# Patient Record
Sex: FEMALE | Race: WHITE | Employment: UNEMPLOYED | URBAN - METROPOLITAN AREA
[De-identification: names, ages, dates, MRNs, and addresses within clinical notes are randomized per-mention and may not be internally consistent; named-entity substitution may affect disease eponyms.]

---

## 2018-06-05 PROBLEM — G25.0 ESSENTIAL TREMOR: Status: ACTIVE | Noted: 2018-06-05

## 2018-08-13 ENCOUNTER — TELEPHONE (OUTPATIENT)
Dept: ADMINISTRATIVE | Age: 48
End: 2018-08-13

## 2018-08-13 ENCOUNTER — OFFICE VISIT (OUTPATIENT)
Dept: FAMILY MEDICINE CLINIC | Age: 48
End: 2018-08-13
Payer: COMMERCIAL

## 2018-08-13 VITALS
SYSTOLIC BLOOD PRESSURE: 132 MMHG | WEIGHT: 252 LBS | DIASTOLIC BLOOD PRESSURE: 84 MMHG | OXYGEN SATURATION: 99 % | HEIGHT: 69 IN | RESPIRATION RATE: 20 BRPM | HEART RATE: 120 BPM | BODY MASS INDEX: 37.33 KG/M2

## 2018-08-13 DIAGNOSIS — E11.65 TYPE 2 DIABETES MELLITUS WITH HYPERGLYCEMIA, WITH LONG-TERM CURRENT USE OF INSULIN (HCC): Primary | ICD-10-CM

## 2018-08-13 DIAGNOSIS — F31.9 BIPOLAR 1 DISORDER (HCC): ICD-10-CM

## 2018-08-13 DIAGNOSIS — Z79.4 TYPE 2 DIABETES MELLITUS WITH HYPERGLYCEMIA, WITH LONG-TERM CURRENT USE OF INSULIN (HCC): Primary | ICD-10-CM

## 2018-08-13 DIAGNOSIS — F51.01 PRIMARY INSOMNIA: ICD-10-CM

## 2018-08-13 PROCEDURE — G8427 DOCREV CUR MEDS BY ELIG CLIN: HCPCS | Performed by: FAMILY MEDICINE

## 2018-08-13 PROCEDURE — G8417 CALC BMI ABV UP PARAM F/U: HCPCS | Performed by: FAMILY MEDICINE

## 2018-08-13 PROCEDURE — 99214 OFFICE O/P EST MOD 30 MIN: CPT | Performed by: FAMILY MEDICINE

## 2018-08-13 PROCEDURE — 2022F DILAT RTA XM EVC RTNOPTHY: CPT | Performed by: FAMILY MEDICINE

## 2018-08-13 PROCEDURE — 3046F HEMOGLOBIN A1C LEVEL >9.0%: CPT | Performed by: FAMILY MEDICINE

## 2018-08-13 PROCEDURE — 1036F TOBACCO NON-USER: CPT | Performed by: FAMILY MEDICINE

## 2018-08-13 RX ORDER — MIRTAZAPINE 30 MG/1
30 TABLET, FILM COATED ORAL NIGHTLY
Qty: 30 TABLET | Refills: 3 | Status: SHIPPED | OUTPATIENT
Start: 2018-08-13 | End: 2018-09-05 | Stop reason: HOSPADM

## 2018-08-13 ASSESSMENT — ENCOUNTER SYMPTOMS
SHORTNESS OF BREATH: 0
NAUSEA: 0
VOMITING: 0
DIARRHEA: 0

## 2018-08-13 ASSESSMENT — PATIENT HEALTH QUESTIONNAIRE - PHQ9
1. LITTLE INTEREST OR PLEASURE IN DOING THINGS: 0
SUM OF ALL RESPONSES TO PHQ QUESTIONS 1-9: 0
SUM OF ALL RESPONSES TO PHQ QUESTIONS 1-9: 0
SUM OF ALL RESPONSES TO PHQ9 QUESTIONS 1 & 2: 0
1. LITTLE INTEREST OR PLEASURE IN DOING THINGS: 0
2. FEELING DOWN, DEPRESSED OR HOPELESS: 0
2. FEELING DOWN, DEPRESSED OR HOPELESS: 0
SUM OF ALL RESPONSES TO PHQ QUESTIONS 1-9: 0
SUM OF ALL RESPONSES TO PHQ9 QUESTIONS 1 & 2: 0
SUM OF ALL RESPONSES TO PHQ QUESTIONS 1-9: 0

## 2018-08-13 NOTE — PROGRESS NOTES
times daily for 30 days. Take 1 in am 1 in afternoon and 2 hs. 120 capsule 5    chlorproMAZINE (THORAZINE) 25 MG tablet       aspirin (ECOTRIN LOW STRENGTH) 81 MG EC tablet Take 1 tablet by mouth 2 times daily 60 tablet 5    Exatsb-IpLqt-QpKgb-Meth-FA-DHA (PRENATE MINI) 18-0.6-0.4-350 MG CAPS Take 1 capsule by mouth daily 30 capsule 5    lithium 300 MG capsule Take 300 mg by mouth daily      baclofen (LIORESAL) 10 MG tablet Take 1 tablet by mouth 3 times daily (Patient taking differently: Take 10 mg by mouth daily ) 10 tablet 0    clonazePAM (KLONOPIN) 2 MG tablet Take 1 mg by mouth daily .  pregabalin (LYRICA) 50 MG capsule Take 1 capsule by mouth 3 times daily for 14 days. . 42 capsule 0     No current facility-administered medications for this visit. Wt Readings from Last 3 Encounters:   08/13/18 252 lb (114.3 kg)   08/02/18 230 lb (104.3 kg)   07/09/18 230 lb (104.3 kg)                   Vitals:    08/13/18 1507   BP: 132/84   Pulse: 120   Resp: 20   SpO2: 99%       Physical Exam   Constitutional: She is oriented to person, place, and time. She appears well-developed and well-nourished. No distress. Neck: Neck supple. Carotid bruit is not present. Cardiovascular: Normal rate, regular rhythm and normal heart sounds. Exam reveals no gallop. No murmur heard. Pulmonary/Chest: Effort normal and breath sounds normal. She has no wheezes. She has no rales. Abdominal: Soft. Bowel sounds are normal. She exhibits no distension. There is no tenderness. Musculoskeletal: She exhibits no edema. Neurological: She is alert and oriented to person, place, and time. Skin: Skin is warm and dry. Psychiatric: She has a normal mood and affect. Vitals reviewed. ASSESSMENT/PLAN  Prasad Mcneal was seen today for follow-up from hospital and insomnia.     Diagnoses and all orders for this visit:    Type 2 diabetes mellitus with hyperglycemia, with long-term current use of insulin ([address]  -     Cancel:

## 2018-08-13 NOTE — PATIENT INSTRUCTIONS
plate format. Talk to your doctor, a dietitian, or a diabetes educator about your concerns. Carbohydrate counting  With carbohydrate counting, you plan meals based on the amount of carbohydrate in each food. Carbohydrate raises blood sugar higher and more quickly than any other nutrient. It is found in desserts, breads and cereals, and fruit. It's also found in starchy vegetables such as potatoes and corn, grains such as rice and pasta, and milk and yogurt. Spreading carbohydrate throughout the day helps keep your blood sugar levels within your target range. Your daily amount depends on several things, including your weight, how active you are, which diabetes medicines you take, and what your goals are for your blood sugar levels. A registered dietitian or diabetes educator can help you plan how much carbohydrate to include in each meal and snack. A guideline for your daily amount of carbohydrate is:  · 45 to 60 grams at each meal. That's about the same as 3 to 4 carbohydrate servings. · 15 to 20 grams at each snack. That's about the same as 1 carbohydrate serving. The Nutrition Facts label on packaged foods tells you how much carbohydrate is in a serving of the food. First, look at the serving size on the food label. Is that the amount you eat in a serving? All of the nutrition information on a food label is based on that serving size. So if you eat more or less than that, you'll need to adjust the other numbers. Total carbohydrate is the next thing you need to look for on the label. If you count carbohydrate servings, one serving of carbohydrate is 15 grams. For foods that don't come with labels, such as fresh fruits and vegetables, you'll need a guide that lists carbohydrate in these foods. Ask your doctor, dietitian, or diabetes educator about books or other nutrition guides you can use.   If you take insulin, you need to know how many grams of carbohydrate are in a meal. This lets you know how much

## 2018-08-13 NOTE — TELEPHONE ENCOUNTER
Hospital discharge 8/11/18, diabetic level 180-200, given insulin, starting to come down; [facility]; unable to schedule appt within the timeframe requested this week.   Transferred to Clinical staff at office; Steve took the call

## 2018-08-14 ENCOUNTER — TELEPHONE (OUTPATIENT)
Dept: FAMILY MEDICINE CLINIC | Age: 48
End: 2018-08-14

## 2018-08-17 PROBLEM — Z79.4 TYPE 2 DIABETES MELLITUS WITH HYPERGLYCEMIA, WITH LONG-TERM CURRENT USE OF INSULIN (HCC): Status: ACTIVE | Noted: 2018-08-17

## 2018-08-17 PROBLEM — E11.65 TYPE 2 DIABETES MELLITUS WITH HYPERGLYCEMIA, WITH LONG-TERM CURRENT USE OF INSULIN (HCC): Status: ACTIVE | Noted: 2018-08-17

## 2018-08-27 ENCOUNTER — TELEPHONE (OUTPATIENT)
Dept: FAMILY MEDICINE CLINIC | Age: 48
End: 2018-08-27

## 2018-08-27 DIAGNOSIS — E11.65 TYPE 2 DIABETES MELLITUS WITH HYPERGLYCEMIA, WITH LONG-TERM CURRENT USE OF INSULIN (HCC): Primary | ICD-10-CM

## 2018-08-27 DIAGNOSIS — Z79.4 TYPE 2 DIABETES MELLITUS WITH HYPERGLYCEMIA, WITH LONG-TERM CURRENT USE OF INSULIN (HCC): Primary | ICD-10-CM

## 2018-08-27 NOTE — TELEPHONE ENCOUNTER
Pt needs dm supplies sent to [facility] [address] lancets, stripes alcohol pads for free style machine

## 2018-08-30 RX ORDER — LANCETS 28 GAUGE
1 EACH MISCELLANEOUS 2 TIMES DAILY
Qty: 100 EACH | Refills: 3 | Status: CANCELLED | OUTPATIENT
Start: 2018-08-30

## 2018-08-30 RX ORDER — BLOOD-GLUCOSE METER
KIT MISCELLANEOUS
Qty: 1 KIT | Refills: 0 | Status: CANCELLED | OUTPATIENT
Start: 2018-08-30

## 2018-08-31 RX ORDER — BLOOD-GLUCOSE METER
KIT MISCELLANEOUS
Qty: 1 DEVICE | Refills: 0 | Status: SHIPPED | OUTPATIENT
Start: 2018-08-31 | End: 2018-11-05 | Stop reason: SDUPTHER

## 2018-08-31 RX ORDER — PEN NEEDLE, DIABETIC 31 GX5/16"
NEEDLE, DISPOSABLE MISCELLANEOUS
Qty: 100 EACH | Refills: 12 | Status: SHIPPED | OUTPATIENT
Start: 2018-08-31 | End: 2019-07-31 | Stop reason: CLARIF

## 2018-08-31 RX ORDER — LANCETS 28 GAUGE
EACH MISCELLANEOUS
Qty: 100 EACH | Refills: 12 | Status: SHIPPED | OUTPATIENT
Start: 2018-08-31 | End: 2019-02-20 | Stop reason: SDUPTHER

## 2018-09-05 ENCOUNTER — OFFICE VISIT (OUTPATIENT)
Dept: FAMILY MEDICINE CLINIC | Age: 48
End: 2018-09-05
Payer: COMMERCIAL

## 2018-09-05 VITALS
RESPIRATION RATE: 20 BRPM | HEIGHT: 69 IN | BODY MASS INDEX: 37.03 KG/M2 | HEART RATE: 102 BPM | SYSTOLIC BLOOD PRESSURE: 122 MMHG | WEIGHT: 250 LBS | DIASTOLIC BLOOD PRESSURE: 84 MMHG | OXYGEN SATURATION: 98 %

## 2018-09-05 DIAGNOSIS — E66.9 OBESITY (BMI 30-39.9): ICD-10-CM

## 2018-09-05 DIAGNOSIS — Z79.4 TYPE 2 DIABETES MELLITUS WITH HYPERGLYCEMIA, WITH LONG-TERM CURRENT USE OF INSULIN (HCC): Primary | ICD-10-CM

## 2018-09-05 DIAGNOSIS — E11.65 TYPE 2 DIABETES MELLITUS WITH HYPERGLYCEMIA, WITH LONG-TERM CURRENT USE OF INSULIN (HCC): Primary | ICD-10-CM

## 2018-09-05 DIAGNOSIS — F51.04 PSYCHOPHYSIOLOGICAL INSOMNIA: ICD-10-CM

## 2018-09-05 LAB — HBA1C MFR BLD: 8.6 %

## 2018-09-05 PROCEDURE — 2022F DILAT RTA XM EVC RTNOPTHY: CPT | Performed by: FAMILY MEDICINE

## 2018-09-05 PROCEDURE — 83036 HEMOGLOBIN GLYCOSYLATED A1C: CPT | Performed by: FAMILY MEDICINE

## 2018-09-05 PROCEDURE — 3045F PR MOST RECENT HEMOGLOBIN A1C LEVEL 7.0-9.0%: CPT | Performed by: FAMILY MEDICINE

## 2018-09-05 PROCEDURE — 99214 OFFICE O/P EST MOD 30 MIN: CPT | Performed by: FAMILY MEDICINE

## 2018-09-05 PROCEDURE — 1036F TOBACCO NON-USER: CPT | Performed by: FAMILY MEDICINE

## 2018-09-05 PROCEDURE — G8417 CALC BMI ABV UP PARAM F/U: HCPCS | Performed by: FAMILY MEDICINE

## 2018-09-05 PROCEDURE — G8427 DOCREV CUR MEDS BY ELIG CLIN: HCPCS | Performed by: FAMILY MEDICINE

## 2018-09-05 RX ORDER — ESZOPICLONE 2 MG/1
2 TABLET, FILM COATED ORAL NIGHTLY
Qty: 30 TABLET | Refills: 1 | Status: SHIPPED | OUTPATIENT
Start: 2018-09-05 | End: 2018-11-05 | Stop reason: SDUPTHER

## 2018-09-05 RX ORDER — ATORVASTATIN CALCIUM 20 MG/1
20 TABLET, FILM COATED ORAL DAILY
Qty: 30 TABLET | Refills: 3 | Status: SHIPPED | OUTPATIENT
Start: 2018-09-05 | End: 2018-11-05 | Stop reason: SDUPTHER

## 2018-09-05 ASSESSMENT — ENCOUNTER SYMPTOMS
SHORTNESS OF BREATH: 0
BLURRED VISION: 0
NAUSEA: 0
DIARRHEA: 0
VOMITING: 0

## 2018-09-05 NOTE — PROGRESS NOTES
is alert and oriented to person, place, and time. Skin: Skin is warm and dry. Psychiatric: Affect normal.   Vitals reviewed. Results for orders placed or performed in visit on 09/05/18   POCT glycosylated hemoglobin (Hb A1C)   Result Value Ref Range    Hemoglobin A1C 8.6 %         Micki Fishman was seen today for diabetes. Diagnoses and all orders for this visit:    Type 2 diabetes mellitus with hyperglycemia, with long-term current use of insulin (HCC)  -     POCT glycosylated hemoglobin (Hb A1C)  -     insulin glargine (BASAGLAR KWIKPEN) 100 UNIT/ML injection pen; Inject 25 Units into the skin daily  -     atorvastatin (LIPITOR) 20 MG tablet; Take 1 tablet by mouth daily    Psychophysiological insomnia  -     start eszopiclone (LUNESTA) 2 MG TABS; Take 1 tablet by mouth nightly. .    Obesity (BMI 30-39.9)        -     BMI was elevated today, and weight loss plan recommended is : conventional weight loss. Phone/MyChart follow up if tests abnormal.    Return in about 2 months (around 11/5/2018) for diabetes. I have reviewed my findings and recommendations with [facility] AND [facility].     Singh Son M.D

## 2018-09-09 PROBLEM — E66.9 OBESITY (BMI 30-39.9): Status: ACTIVE | Noted: 2018-09-09

## 2018-10-12 DIAGNOSIS — L70.9 ACNE, UNSPECIFIED ACNE TYPE: ICD-10-CM

## 2018-10-15 RX ORDER — ERYTHROMYCIN 20 MG/ML
SOLUTION TOPICAL
Qty: 60 ML | Refills: 3 | Status: SHIPPED
Start: 2018-10-15 | End: 2020-07-29

## 2018-10-15 RX ORDER — PEN NEEDLE, DIABETIC 30 GX5/16"
1 NEEDLE, DISPOSABLE MISCELLANEOUS DAILY
Qty: 100 EACH | Refills: 5 | Status: SHIPPED | OUTPATIENT
Start: 2018-10-15 | End: 2018-11-05 | Stop reason: SDUPTHER

## 2018-10-16 ENCOUNTER — TELEPHONE (OUTPATIENT)
Dept: FAMILY MEDICINE CLINIC | Age: 48
End: 2018-10-16

## 2018-10-18 ENCOUNTER — HOSPITAL ENCOUNTER (EMERGENCY)
Age: 48
Discharge: HOME | End: 2018-10-18
Payer: COMMERCIAL

## 2018-10-18 VITALS
OXYGEN SATURATION: 97 % | BODY MASS INDEX: 36.92 KG/M2 | RESPIRATION RATE: 20 BRPM | SYSTOLIC BLOOD PRESSURE: 114 MMHG | TEMPERATURE: 98.2 F | WEIGHT: 250 LBS | DIASTOLIC BLOOD PRESSURE: 84 MMHG | HEART RATE: 105 BPM

## 2018-10-18 DIAGNOSIS — R59.1 LYMPHADENOPATHY: Primary | ICD-10-CM

## 2018-10-18 PROCEDURE — 99212 OFFICE O/P EST SF 10 MIN: CPT

## 2018-10-18 RX ORDER — CLINDAMYCIN HYDROCHLORIDE 150 MG/1
300 CAPSULE ORAL 4 TIMES DAILY
Qty: 56 CAPSULE | Refills: 0 | Status: SHIPPED | OUTPATIENT
Start: 2018-10-18 | End: 2018-10-25

## 2018-10-18 RX ORDER — IBUPROFEN 600 MG/1
600 TABLET ORAL EVERY 8 HOURS PRN
Qty: 21 TABLET | Refills: 0 | Status: SHIPPED | OUTPATIENT
Start: 2018-10-18 | End: 2018-11-05

## 2018-11-05 ENCOUNTER — OFFICE VISIT (OUTPATIENT)
Dept: FAMILY MEDICINE CLINIC | Age: 48
End: 2018-11-05
Payer: COMMERCIAL

## 2018-11-05 VITALS
DIASTOLIC BLOOD PRESSURE: 84 MMHG | SYSTOLIC BLOOD PRESSURE: 132 MMHG | HEART RATE: 106 BPM | WEIGHT: 259 LBS | OXYGEN SATURATION: 94 % | BODY MASS INDEX: 38.25 KG/M2

## 2018-11-05 DIAGNOSIS — Z12.39 BREAST CANCER SCREENING: ICD-10-CM

## 2018-11-05 DIAGNOSIS — E11.65 TYPE 2 DIABETES MELLITUS WITH HYPERGLYCEMIA, WITH LONG-TERM CURRENT USE OF INSULIN (HCC): Primary | ICD-10-CM

## 2018-11-05 DIAGNOSIS — G25.0 ESSENTIAL TREMOR: ICD-10-CM

## 2018-11-05 DIAGNOSIS — Z79.4 TYPE 2 DIABETES MELLITUS WITH HYPERGLYCEMIA, WITH LONG-TERM CURRENT USE OF INSULIN (HCC): Primary | ICD-10-CM

## 2018-11-05 DIAGNOSIS — F51.04 PSYCHOPHYSIOLOGICAL INSOMNIA: ICD-10-CM

## 2018-11-05 LAB
CREATININE URINE POCT: 50
MICROALBUMIN/CREAT 24H UR: 10 MG/G{CREAT}
MICROALBUMIN/CREAT UR-RTO: NORMAL

## 2018-11-05 PROCEDURE — G8417 CALC BMI ABV UP PARAM F/U: HCPCS | Performed by: FAMILY MEDICINE

## 2018-11-05 PROCEDURE — G8484 FLU IMMUNIZE NO ADMIN: HCPCS | Performed by: FAMILY MEDICINE

## 2018-11-05 PROCEDURE — G8427 DOCREV CUR MEDS BY ELIG CLIN: HCPCS | Performed by: FAMILY MEDICINE

## 2018-11-05 PROCEDURE — 1036F TOBACCO NON-USER: CPT | Performed by: FAMILY MEDICINE

## 2018-11-05 PROCEDURE — 99214 OFFICE O/P EST MOD 30 MIN: CPT | Performed by: FAMILY MEDICINE

## 2018-11-05 PROCEDURE — 2022F DILAT RTA XM EVC RTNOPTHY: CPT | Performed by: FAMILY MEDICINE

## 2018-11-05 PROCEDURE — 3045F PR MOST RECENT HEMOGLOBIN A1C LEVEL 7.0-9.0%: CPT | Performed by: FAMILY MEDICINE

## 2018-11-05 PROCEDURE — 82044 UR ALBUMIN SEMIQUANTITATIVE: CPT | Performed by: FAMILY MEDICINE

## 2018-11-05 RX ORDER — BLOOD-GLUCOSE METER
KIT MISCELLANEOUS
Qty: 1 DEVICE | Refills: 0 | Status: SHIPPED | OUTPATIENT
Start: 2018-11-05 | End: 2019-07-31 | Stop reason: CLARIF

## 2018-11-05 RX ORDER — PEN NEEDLE, DIABETIC 31 GX5/16"
NEEDLE, DISPOSABLE MISCELLANEOUS
Qty: 100 EACH | Refills: 12 | Status: CANCELLED | OUTPATIENT
Start: 2018-11-05

## 2018-11-05 RX ORDER — ERYTHROMYCIN 20 MG/ML
SOLUTION TOPICAL
Qty: 60 ML | Refills: 3 | Status: CANCELLED | OUTPATIENT
Start: 2018-11-05

## 2018-11-05 RX ORDER — PEN NEEDLE, DIABETIC 30 GX5/16"
1 NEEDLE, DISPOSABLE MISCELLANEOUS DAILY
Qty: 100 EACH | Refills: 5 | Status: SHIPPED | OUTPATIENT
Start: 2018-11-05 | End: 2019-01-07 | Stop reason: SDUPTHER

## 2018-11-05 RX ORDER — LANCETS 28 GAUGE
EACH MISCELLANEOUS
Qty: 100 EACH | Refills: 12 | Status: CANCELLED | OUTPATIENT
Start: 2018-11-05

## 2018-11-05 RX ORDER — ATORVASTATIN CALCIUM 20 MG/1
20 TABLET, FILM COATED ORAL DAILY
Qty: 30 TABLET | Refills: 3 | Status: SHIPPED | OUTPATIENT
Start: 2018-11-05 | End: 2019-04-24 | Stop reason: SDUPTHER

## 2018-11-05 RX ORDER — ESZOPICLONE 3 MG/1
3 TABLET, FILM COATED ORAL NIGHTLY
Qty: 30 TABLET | Refills: 3 | Status: SHIPPED | OUTPATIENT
Start: 2018-11-05 | End: 2019-02-20 | Stop reason: SINTOL

## 2018-11-05 ASSESSMENT — ENCOUNTER SYMPTOMS
NAUSEA: 0
DIARRHEA: 0
SHORTNESS OF BREATH: 0
BACK PAIN: 1
VOMITING: 0

## 2018-11-05 ASSESSMENT — PATIENT HEALTH QUESTIONNAIRE - PHQ9
SUM OF ALL RESPONSES TO PHQ QUESTIONS 1-9: 2
SUM OF ALL RESPONSES TO PHQ9 QUESTIONS 1 & 2: 2
2. FEELING DOWN, DEPRESSED OR HOPELESS: 1
SUM OF ALL RESPONSES TO PHQ QUESTIONS 1-9: 2
1. LITTLE INTEREST OR PLEASURE IN DOING THINGS: 1

## 2018-11-05 NOTE — PROGRESS NOTES
OFFICE PROGRESS NOTE      SUBJECTIVE:        Patient ID:   Nolan Pompa is a 51 y.o. female who presents for   Chief Complaint   Patient presents with    Diabetes       HPI:   Patient is here to follow up on diabetes. Fasting blood sugars:300-360 Midday blood sugars: up to 419. Patient checks blood glucose 1-2 times per day. Patient is following diabetic diet. Patient is a nonsmoker. Last ophthalmology visit: last week. Patient is taking daily statin. Patient still has worsening hand tremor. Patient wants a script for \"weighted utensils\"--was told this would be covered by insurance. Patient doing well on current regimen for insomnia. Prior to Admission medications    Medication Sig Start Date End Date Taking? Authorizing Provider   atorvastatin (LIPITOR) 20 MG tablet Take 1 tablet by mouth daily 11/5/18  Yes Sergio Flowers MD   Blood Glucose Monitoring Suppl (FREESTYLE LITE) [place] Check blood sugar bid 11/5/18  Yes Sergio Flowers MD   eszopiclone (LUNESTA) 3 MG TABS Take 1 tablet by mouth nightly. . 11/5/18 11/5/19 Yes Sergio Flowers MD   insulin glargine (BASAGLAR KWIKPEN) 100 UNIT/ML injection pen Inject 35 Units into the skin daily 11/5/18  Yes Sergio Flowers MD   Insulin Pen Needle (PEN NEEDLES 5/16\") 30G X 8 MM MISC 1 each by Does not apply route daily 11/5/18  Yes Sergio Flowers MD   Misc. Devices MISC Sharps container 11/5/18  Yes Sergio Flowers MD   Misc. Devices MISC Weighted Silverware 11/5/18  Yes Sergio Flowers MD   erythromycin with ethanol [facility]) 2 % external solution Apply topically daily.  10/15/18  Yes Sergio Flowers MD   Alcohol Swabs (ALCOHOL PREP) PADS Use as directed 8/31/18  Yes Sergio Flowers MD   FREESTYLE LANCETS MISC Check blood sugar bid 8/31/18  Yes Sergio Flowers MD   blood glucose test strips (FREESTYLE LITE) strip Check blood sugar bid 8/31/18

## 2018-11-26 ENCOUNTER — HOSPITAL ENCOUNTER (OUTPATIENT)
Dept: MAMMOGRAPHY | Age: 48
Discharge: HOME | End: 2018-11-28
Payer: COMMERCIAL

## 2018-11-26 DIAGNOSIS — Z12.39 BREAST CANCER SCREENING: ICD-10-CM

## 2018-11-26 PROCEDURE — 77063 BREAST TOMOSYNTHESIS BI: CPT

## 2018-11-30 ENCOUNTER — HOSPITAL ENCOUNTER (OUTPATIENT)
Dept: ULTRASOUND IMAGING | Age: 48
Discharge: HOME | End: 2018-11-30
Payer: COMMERCIAL

## 2018-11-30 ENCOUNTER — HOSPITAL ENCOUNTER (OUTPATIENT)
Age: 48
Discharge: HOME | End: 2018-11-30
Payer: COMMERCIAL

## 2018-11-30 DIAGNOSIS — B18.2 HEP C W/ COMA, CHRONIC: ICD-10-CM

## 2018-11-30 LAB
ALBUMIN SERPL-MCNC: 3.5 G/DL (ref 3.5–5.2)
ALP BLD-CCNC: 99 U/L (ref 35–104)
ALT SERPL-CCNC: 264 U/L (ref 0–32)
ANION GAP SERPL CALCULATED.3IONS-SCNC: 10 MMOL/L (ref 7–16)
AST SERPL-CCNC: 243 U/L (ref 0–31)
BASOPHILS ABSOLUTE: 0.08 E9/L (ref 0–0.2)
BASOPHILS RELATIVE PERCENT: 0.9 % (ref 0–2)
BILIRUB SERPL-MCNC: 0.7 MG/DL (ref 0–1.2)
BUN BLDV-MCNC: 8 MG/DL (ref 6–20)
CALCIUM SERPL-MCNC: 9.2 MG/DL (ref 8.6–10.2)
CHLORIDE BLD-SCNC: 95 MMOL/L (ref 98–107)
CO2: 29 MMOL/L (ref 22–29)
CREAT SERPL-MCNC: 0.9 MG/DL (ref 0.5–1)
EOSINOPHILS ABSOLUTE: 0.3 E9/L (ref 0.05–0.5)
EOSINOPHILS RELATIVE PERCENT: 3.2 % (ref 0–6)
GFR AFRICAN AMERICAN: >60
GFR NON-AFRICAN AMERICAN: >60 ML/MIN/1.73
GLUCOSE BLD-MCNC: 379 MG/DL (ref 74–99)
HCT VFR BLD CALC: 44.3 % (ref 34–48)
HEMOGLOBIN: 14.2 G/DL (ref 11.5–15.5)
IMMATURE GRANULOCYTES #: 0.03 E9/L
IMMATURE GRANULOCYTES %: 0.3 % (ref 0–5)
LYMPHOCYTES ABSOLUTE: 3.14 E9/L (ref 1.5–4)
LYMPHOCYTES RELATIVE PERCENT: 33.9 % (ref 20–42)
MCH RBC QN AUTO: 31 PG (ref 26–35)
MCHC RBC AUTO-ENTMCNC: 32.1 % (ref 32–34.5)
MCV RBC AUTO: 96.7 FL (ref 80–99.9)
MONOCYTES ABSOLUTE: 0.6 E9/L (ref 0.1–0.95)
MONOCYTES RELATIVE PERCENT: 6.5 % (ref 2–12)
NEUTROPHILS ABSOLUTE: 5.12 E9/L (ref 1.8–7.3)
NEUTROPHILS RELATIVE PERCENT: 55.2 % (ref 43–80)
PDW BLD-RTO: 13.4 FL (ref 11.5–15)
PLATELET # BLD: 181 E9/L (ref 130–450)
PMV BLD AUTO: 10.8 FL (ref 7–12)
POTASSIUM SERPL-SCNC: 4.5 MMOL/L (ref 3.5–5)
RBC # BLD: 4.58 E12/L (ref 3.5–5.5)
SODIUM BLD-SCNC: 134 MMOL/L (ref 132–146)
TOTAL PROTEIN: 7.3 G/DL (ref 6.4–8.3)
WBC # BLD: 9.3 E9/L (ref 4.5–11.5)

## 2018-11-30 PROCEDURE — 87522 HEPATITIS C REVRS TRNSCRPJ: CPT

## 2018-11-30 PROCEDURE — 86703 HIV-1/HIV-2 1 RESULT ANTBDY: CPT

## 2018-11-30 PROCEDURE — 36415 COLL VENOUS BLD VENIPUNCTURE: CPT

## 2018-11-30 PROCEDURE — 86704 HEP B CORE ANTIBODY TOTAL: CPT

## 2018-11-30 PROCEDURE — 76705 ECHO EXAM OF ABDOMEN: CPT

## 2018-11-30 PROCEDURE — 80053 COMPREHEN METABOLIC PANEL: CPT

## 2018-11-30 PROCEDURE — 87340 HEPATITIS B SURFACE AG IA: CPT

## 2018-11-30 PROCEDURE — 85025 COMPLETE CBC W/AUTO DIFF WBC: CPT

## 2018-12-02 LAB
HCV QNT BY NAAT IU/ML: ABNORMAL
HCV QNT BY NAAT LOG IU/ML: 6.65 LOG IU/ML
INTERPRETATION: DETECTED

## 2018-12-03 LAB
HEPATITIS B SURFACE ANTIGEN INTERPRETATION: NORMAL
HIV-1 AND HIV-2 ANTIBODIES: NORMAL

## 2018-12-05 LAB — HEPATITIS B CORE TOTAL ANTIBODY: NONREACTIVE

## 2018-12-28 ENCOUNTER — HOSPITAL ENCOUNTER (OUTPATIENT)
Dept: ULTRASOUND IMAGING | Age: 48
Discharge: HOME | End: 2018-12-28
Payer: COMMERCIAL

## 2018-12-28 ENCOUNTER — HOSPITAL ENCOUNTER (OUTPATIENT)
Dept: MAMMOGRAPHY | Age: 48
Discharge: HOME | End: 2018-12-30
Payer: COMMERCIAL

## 2018-12-28 DIAGNOSIS — N64.89 BREAST ASYMMETRY: ICD-10-CM

## 2018-12-28 PROCEDURE — 77065 DX MAMMO INCL CAD UNI: CPT

## 2018-12-28 PROCEDURE — 76642 ULTRASOUND BREAST LIMITED: CPT

## 2019-01-07 ENCOUNTER — OFFICE VISIT (OUTPATIENT)
Dept: FAMILY MEDICINE CLINIC | Age: 49
End: 2019-01-07
Payer: COMMERCIAL

## 2019-01-07 VITALS
OXYGEN SATURATION: 98 % | BODY MASS INDEX: 36.88 KG/M2 | WEIGHT: 249 LBS | HEART RATE: 125 BPM | HEIGHT: 69 IN | DIASTOLIC BLOOD PRESSURE: 82 MMHG | SYSTOLIC BLOOD PRESSURE: 132 MMHG | RESPIRATION RATE: 20 BRPM

## 2019-01-07 DIAGNOSIS — G25.0 ESSENTIAL TREMOR: ICD-10-CM

## 2019-01-07 DIAGNOSIS — E11.65 TYPE 2 DIABETES MELLITUS WITH HYPERGLYCEMIA, WITH LONG-TERM CURRENT USE OF INSULIN (HCC): Primary | ICD-10-CM

## 2019-01-07 DIAGNOSIS — G89.29 CHRONIC MIDLINE LOW BACK PAIN, WITH SCIATICA PRESENCE UNSPECIFIED: ICD-10-CM

## 2019-01-07 DIAGNOSIS — M54.5 CHRONIC MIDLINE LOW BACK PAIN, WITH SCIATICA PRESENCE UNSPECIFIED: ICD-10-CM

## 2019-01-07 DIAGNOSIS — F31.9 BIPOLAR DEPRESSION (HCC): ICD-10-CM

## 2019-01-07 DIAGNOSIS — Z79.4 TYPE 2 DIABETES MELLITUS WITH HYPERGLYCEMIA, WITH LONG-TERM CURRENT USE OF INSULIN (HCC): Primary | ICD-10-CM

## 2019-01-07 LAB — HBA1C MFR BLD: 12.3 %

## 2019-01-07 PROCEDURE — 3046F HEMOGLOBIN A1C LEVEL >9.0%: CPT | Performed by: FAMILY MEDICINE

## 2019-01-07 PROCEDURE — G8484 FLU IMMUNIZE NO ADMIN: HCPCS | Performed by: FAMILY MEDICINE

## 2019-01-07 PROCEDURE — G8427 DOCREV CUR MEDS BY ELIG CLIN: HCPCS | Performed by: FAMILY MEDICINE

## 2019-01-07 PROCEDURE — G8417 CALC BMI ABV UP PARAM F/U: HCPCS | Performed by: FAMILY MEDICINE

## 2019-01-07 PROCEDURE — 1036F TOBACCO NON-USER: CPT | Performed by: FAMILY MEDICINE

## 2019-01-07 PROCEDURE — 99214 OFFICE O/P EST MOD 30 MIN: CPT | Performed by: FAMILY MEDICINE

## 2019-01-07 PROCEDURE — 83036 HEMOGLOBIN GLYCOSYLATED A1C: CPT | Performed by: FAMILY MEDICINE

## 2019-01-07 PROCEDURE — 2022F DILAT RTA XM EVC RTNOPTHY: CPT | Performed by: FAMILY MEDICINE

## 2019-01-07 RX ORDER — PEN NEEDLE, DIABETIC 30 GX5/16"
1 NEEDLE, DISPOSABLE MISCELLANEOUS DAILY
Qty: 100 EACH | Refills: 5 | Status: SHIPPED | OUTPATIENT
Start: 2019-01-07 | End: 2019-04-24 | Stop reason: HOSPADM

## 2019-01-07 ASSESSMENT — PATIENT HEALTH QUESTIONNAIRE - PHQ9
2. FEELING DOWN, DEPRESSED OR HOPELESS: 1
SUM OF ALL RESPONSES TO PHQ QUESTIONS 1-9: 1
1. LITTLE INTEREST OR PLEASURE IN DOING THINGS: 0
SUM OF ALL RESPONSES TO PHQ9 QUESTIONS 1 & 2: 1
SUM OF ALL RESPONSES TO PHQ QUESTIONS 1-9: 1

## 2019-01-07 ASSESSMENT — ENCOUNTER SYMPTOMS
NAUSEA: 0
VOMITING: 0
DIARRHEA: 0
SHORTNESS OF BREATH: 0

## 2019-01-23 ENCOUNTER — TELEPHONE (OUTPATIENT)
Dept: FAMILY MEDICINE CLINIC | Age: 49
End: 2019-01-23

## 2019-02-20 ENCOUNTER — OFFICE VISIT (OUTPATIENT)
Dept: FAMILY MEDICINE CLINIC | Age: 49
End: 2019-02-20
Payer: COMMERCIAL

## 2019-02-20 VITALS
RESPIRATION RATE: 20 BRPM | HEIGHT: 69 IN | OXYGEN SATURATION: 98 % | DIASTOLIC BLOOD PRESSURE: 72 MMHG | BODY MASS INDEX: 36.43 KG/M2 | SYSTOLIC BLOOD PRESSURE: 118 MMHG | WEIGHT: 246 LBS | HEART RATE: 82 BPM

## 2019-02-20 DIAGNOSIS — F51.01 PRIMARY INSOMNIA: ICD-10-CM

## 2019-02-20 DIAGNOSIS — E11.65 TYPE 2 DIABETES MELLITUS WITH HYPERGLYCEMIA, WITH LONG-TERM CURRENT USE OF INSULIN (HCC): Primary | ICD-10-CM

## 2019-02-20 DIAGNOSIS — G25.0 ESSENTIAL TREMOR: ICD-10-CM

## 2019-02-20 DIAGNOSIS — Z79.4 TYPE 2 DIABETES MELLITUS WITH HYPERGLYCEMIA, WITH LONG-TERM CURRENT USE OF INSULIN (HCC): Primary | ICD-10-CM

## 2019-02-20 LAB — HBA1C MFR BLD: 8.3 %

## 2019-02-20 PROCEDURE — 1036F TOBACCO NON-USER: CPT | Performed by: FAMILY MEDICINE

## 2019-02-20 PROCEDURE — 99214 OFFICE O/P EST MOD 30 MIN: CPT | Performed by: FAMILY MEDICINE

## 2019-02-20 PROCEDURE — G8417 CALC BMI ABV UP PARAM F/U: HCPCS | Performed by: FAMILY MEDICINE

## 2019-02-20 PROCEDURE — 83036 HEMOGLOBIN GLYCOSYLATED A1C: CPT | Performed by: FAMILY MEDICINE

## 2019-02-20 PROCEDURE — G8427 DOCREV CUR MEDS BY ELIG CLIN: HCPCS | Performed by: FAMILY MEDICINE

## 2019-02-20 PROCEDURE — G8484 FLU IMMUNIZE NO ADMIN: HCPCS | Performed by: FAMILY MEDICINE

## 2019-02-20 PROCEDURE — 3045F PR MOST RECENT HEMOGLOBIN A1C LEVEL 7.0-9.0%: CPT | Performed by: FAMILY MEDICINE

## 2019-02-20 PROCEDURE — 2022F DILAT RTA XM EVC RTNOPTHY: CPT | Performed by: FAMILY MEDICINE

## 2019-02-20 RX ORDER — ZOLPIDEM TARTRATE 5 MG/1
5 TABLET ORAL NIGHTLY PRN
Qty: 30 TABLET | Refills: 3 | Status: SHIPPED | OUTPATIENT
Start: 2019-02-20 | End: 2019-04-24

## 2019-02-20 RX ORDER — LANCETS 28 GAUGE
EACH MISCELLANEOUS
Qty: 100 EACH | Refills: 12 | Status: SHIPPED | OUTPATIENT
Start: 2019-02-20 | End: 2019-07-31 | Stop reason: CLARIF

## 2019-02-20 ASSESSMENT — ENCOUNTER SYMPTOMS
VOMITING: 0
BACK PAIN: 1
SHORTNESS OF BREATH: 0
DIARRHEA: 0
NAUSEA: 0

## 2019-02-20 ASSESSMENT — PATIENT HEALTH QUESTIONNAIRE - PHQ9
SUM OF ALL RESPONSES TO PHQ QUESTIONS 1-9: 0
SUM OF ALL RESPONSES TO PHQ9 QUESTIONS 1 & 2: 0
1. LITTLE INTEREST OR PLEASURE IN DOING THINGS: 0
SUM OF ALL RESPONSES TO PHQ QUESTIONS 1-9: 0
2. FEELING DOWN, DEPRESSED OR HOPELESS: 0

## 2019-04-24 ENCOUNTER — OFFICE VISIT (OUTPATIENT)
Dept: FAMILY MEDICINE CLINIC | Age: 49
End: 2019-04-24
Payer: COMMERCIAL

## 2019-04-24 VITALS
BODY MASS INDEX: 36.29 KG/M2 | RESPIRATION RATE: 20 BRPM | SYSTOLIC BLOOD PRESSURE: 122 MMHG | HEIGHT: 69 IN | WEIGHT: 245 LBS | OXYGEN SATURATION: 98 % | DIASTOLIC BLOOD PRESSURE: 74 MMHG | HEART RATE: 90 BPM

## 2019-04-24 DIAGNOSIS — F51.01 PRIMARY INSOMNIA: ICD-10-CM

## 2019-04-24 DIAGNOSIS — Z79.4 TYPE 2 DIABETES MELLITUS WITHOUT COMPLICATION, WITH LONG-TERM CURRENT USE OF INSULIN (HCC): Primary | ICD-10-CM

## 2019-04-24 DIAGNOSIS — E11.9 TYPE 2 DIABETES MELLITUS WITHOUT COMPLICATION, WITH LONG-TERM CURRENT USE OF INSULIN (HCC): Primary | ICD-10-CM

## 2019-04-24 LAB — HBA1C MFR BLD: 6.6 %

## 2019-04-24 PROCEDURE — 99214 OFFICE O/P EST MOD 30 MIN: CPT | Performed by: FAMILY MEDICINE

## 2019-04-24 PROCEDURE — 83036 HEMOGLOBIN GLYCOSYLATED A1C: CPT | Performed by: FAMILY MEDICINE

## 2019-04-24 PROCEDURE — 3044F HG A1C LEVEL LT 7.0%: CPT | Performed by: FAMILY MEDICINE

## 2019-04-24 PROCEDURE — 1036F TOBACCO NON-USER: CPT | Performed by: FAMILY MEDICINE

## 2019-04-24 PROCEDURE — G8417 CALC BMI ABV UP PARAM F/U: HCPCS | Performed by: FAMILY MEDICINE

## 2019-04-24 PROCEDURE — 2022F DILAT RTA XM EVC RTNOPTHY: CPT | Performed by: FAMILY MEDICINE

## 2019-04-24 PROCEDURE — G8427 DOCREV CUR MEDS BY ELIG CLIN: HCPCS | Performed by: FAMILY MEDICINE

## 2019-04-24 PROCEDURE — 3017F COLORECTAL CA SCREEN DOC REV: CPT | Performed by: FAMILY MEDICINE

## 2019-04-24 RX ORDER — ATORVASTATIN CALCIUM 20 MG/1
20 TABLET, FILM COATED ORAL DAILY
Qty: 30 TABLET | Refills: 5 | Status: SHIPPED | OUTPATIENT
Start: 2019-04-24 | End: 2019-11-11 | Stop reason: HOSPADM

## 2019-04-24 RX ORDER — TEMAZEPAM 30 MG/1
30 CAPSULE ORAL NIGHTLY PRN
Qty: 30 CAPSULE | Refills: 3 | Status: SHIPPED | OUTPATIENT
Start: 2019-04-24 | End: 2019-07-31 | Stop reason: CLARIF

## 2019-04-24 RX ORDER — MEDICAL SUPPLY, MISCELLANEOUS
EACH MISCELLANEOUS
Qty: 100 EACH | Refills: 12 | Status: SHIPPED | OUTPATIENT
Start: 2019-04-24 | End: 2019-07-31 | Stop reason: CLARIF

## 2019-04-24 ASSESSMENT — ENCOUNTER SYMPTOMS
VOMITING: 0
SHORTNESS OF BREATH: 0
DIARRHEA: 1
NAUSEA: 0

## 2019-04-24 NOTE — PROGRESS NOTES
OFFICE PROGRESS NOTE      SUBJECTIVE:        Patient ID:   Laly Wolff is a 47 y.o. female who presents for   Chief Complaint   Patient presents with    Diabetes     needs rx for large gloves, wants new needles for pen smaller if not want to use vials with needles    Insomnia     no meds work needs something done           HPI:   Patient is here to follow up on diabetes. Fasting blood sugars:not checking  Midday blood sugars: not checking. Patient checks blood glucose 0 times per day. Patient is following diabetic diet. Patient is a smoker/nonsmoker. Last ophthalmology visit: 2018 [facility]. Patient is not taking a daily statin--ran out of it. Patient states she insomnia has not improved. Patient feels exhausted but sometimes cannot sleep for 2 days. Patient states her psychiatrist will not treat her for this. Patient has failed on multiple sleep medications. Prior to Admission medications    Medication Sig Start Date End Date Taking? Authorizing Provider   Insulin Pen Needle (B-D ULTRAFINE III SHORT PEN) 31G X 8 MM MISC 1 each by Does not apply route daily 4/24/19  Yes Jarett Collins MD   atorvastatin (LIPITOR) 20 MG tablet Take 1 tablet by mouth daily 4/24/19  Yes Jarett Collins MD   temazepam (RESTORIL) 30 MG capsule Take 1 capsule by mouth nightly as needed for Sleep. 4/24/19 4/24/20 Yes Jarett Collins MD   Disposable Gloves (LATEX GLOVES LARGE) MISC Use daily as directed 4/24/19  Yes Jarett Collins MD   insulin glargine [facility]) 100 UNIT/ML injection pen Inject 38 Units into the skin daily 2/20/19  Yes Jarett Clolins MD   FREESTYLE LANCETS MISC Check blood sugar bid 2/20/19  Yes Jarett Collins MD   Misc.  Devices MISC Sharps container 2/20/19  Yes Jarett Collins MD   carbidopa-levodopa (SINEMET)  MG per tablet  2/9/19  Yes Historical Provider, MD   metFORMIN (GLUCOPHAGE) 500 MG tablet Take 1 tablet by mouth 2 times daily (with meals) 1/7/19  Yes Atilio Leyva MD   Handicap Placard MISC by Does not apply route Unable to ambulate more than 20 feet without difficulty  Expires 1/31/2024 1/7/19  Yes Atilio Leyva MD   Blood Glucose Monitoring Suppl (FREESTYLE LITE) [place] Check blood sugar bid 11/5/18  Yes Atilio Leyva MD   Misc. Devices MISC Weighted Silverware 11/5/18  Yes Atilio Leyva MD   erythromycin with ethanol [facility]) 2 % external solution Apply topically daily. 10/15/18  Yes Atilio Leyva MD   Alcohol Swabs (ALCOHOL PREP) PADS Use as directed 8/31/18  Yes Atilio Leyva MD   blood glucose test strips (FREESTYLE LITE) strip Check blood sugar bid 8/31/18  Yes Atilio Leyva MD   chlorproMAZINE (THORAZINE) 25 MG tablet  5/30/18  Yes Historical Provider, MD   Txzaml-WoUtl-RsRqq-Meth-FA-DHA (PRENATE MINI) 18-0.6-0.4-350 MG CAPS Take 1 capsule by mouth daily 6/1/18  Yes Carol Morrissey MD   lithium 300 MG capsule Take 300 mg by mouth daily   Yes Historical Provider, MD   baclofen (LIORESAL) 10 MG tablet Take 1 tablet by mouth 3 times daily  Patient taking differently: Take 10 mg by mouth daily  4/21/17  Yes SHILOH Sorenson - CNP   clonazePAM (KLONOPIN) 2 MG tablet Take 1 mg by mouth daily . Yes Historical Provider, MD   gabapentin (NEURONTIN) 400 MG capsule Take 1 capsule by mouth 4 times daily for 30 days.  Take 1 in am 1 in afternoon and 2 hs. 8/2/18 11/5/18  Carol Morrissey MD     Social History     Socioeconomic History    Marital status:      Spouse name: None    Number of children: None    Years of education: None    Highest education level: None   Occupational History    None   Social Needs    Financial resource strain: None    Food insecurity:     Worry: None     Inability: None    Transportation needs:     Medical: None     Non-medical: None   Tobacco Use    Smoking status: Former Smoker     Packs/day: 0.50     Years: 22.00     Pack years: 11.00     Types: Cigarettes     Last attempt to quit: 2017     Years since quittin.3    Smokeless tobacco: Never Used   Substance and Sexual Activity    Alcohol use: No    Drug use: No    Sexual activity: Yes     Partners: Male   Lifestyle    Physical activity:     Days per week: None     Minutes per session: None    Stress: None   Relationships    Social connections:     Talks on phone: None     Gets together: None     Attends Restoration service: None     Active member of club or organization: None     Attends meetings of clubs or organizations: None     Relationship status: None    Intimate partner violence:     Fear of current or ex partner: None     Emotionally abused: None     Physically abused: None     Forced sexual activity: None   Other Topics Concern    None   Social History Narrative    None       I have reviewed Sanam's allergies, medications, problem list, medical, social and family history and have updated as needed in the electronic medical record    Current Outpatient Medications   Medication Sig Dispense Refill    Insulin Pen Needle (B-D ULTRAFINE III SHORT PEN) 31G X 8 MM MISC 1 each by Does not apply route daily 100 each 3    atorvastatin (LIPITOR) 20 MG tablet Take 1 tablet by mouth daily 30 tablet 5    temazepam (RESTORIL) 30 MG capsule Take 1 capsule by mouth nightly as needed for Sleep. 30 capsule 3    Disposable Gloves (LATEX GLOVES LARGE) MISC Use daily as directed 100 each 12    insulin glargine (BASAGLAR KWIKPEN) 100 UNIT/ML injection pen Inject 38 Units into the skin daily 10 pen 5    FREESTYLE LANCETS MISC Check blood sugar bid 100 each 12    Misc.  Devices MISC Sharps container 1 Device 12    carbidopa-levodopa (SINEMET)  MG per tablet       metFORMIN (GLUCOPHAGE) 500 MG tablet Take 1 tablet by mouth 2 times daily (with meals) 180 tablet 1    Handicap Placard MISC by Does not apply route Unable to ambulate more than 20 feet without difficulty  Expires 1/31/2024 1 each 0    Blood Glucose Monitoring Suppl (FREESTYLE LITE) [place] Check blood sugar bid 1 Device 0    Misc. Devices MISC Weighted Silverware 5 Device 0    erythromycin with ethanol (THERAMYCIN) 2 % external solution Apply topically daily. 60 mL 3    Alcohol Swabs (ALCOHOL PREP) PADS Use as directed 100 each 12    blood glucose test strips (FREESTYLE LITE) strip Check blood sugar bid 100 strip 12    chlorproMAZINE (THORAZINE) 25 MG tablet       Bmrrof-BgDiu-VtZfu-Meth-FA-DHA (PRENATE MINI) 18-0.6-0.4-350 MG CAPS Take 1 capsule by mouth daily 30 capsule 5    lithium 300 MG capsule Take 300 mg by mouth daily      baclofen (LIORESAL) 10 MG tablet Take 1 tablet by mouth 3 times daily (Patient taking differently: Take 10 mg by mouth daily ) 10 tablet 0    clonazePAM (KLONOPIN) 2 MG tablet Take 1 mg by mouth daily .  gabapentin (NEURONTIN) 400 MG capsule Take 1 capsule by mouth 4 times daily for 30 days. Take 1 in am 1 in afternoon and 2 hs. 120 capsule 5     No current facility-administered medications for this visit. Review Of Systems:    Review of Systems   Constitutional: Positive for fatigue. Eyes: Negative for visual disturbance. Respiratory: Negative for shortness of breath. Cardiovascular: Positive for chest pain (with anxiety). Negative for palpitations and leg swelling. Gastrointestinal: Positive for diarrhea (comes and goes). Negative for nausea and vomiting. Genitourinary: Negative for difficulty urinating, dysuria and frequency. Skin: Negative for rash. Psychiatric/Behavioral: Positive for dysphoric mood and sleep disturbance. The patient is nervous/anxious.             OBJECTIVE:     VS:  Wt Readings from Last 3 Encounters:   04/24/19 245 lb (111.1 kg)   02/20/19 246 lb (111.6 kg)   01/07/19 249 lb (112.9 kg)     Vitals:    04/24/19 1507   BP: 122/74   Pulse: 90   Resp: 20   SpO2: 98% Physical Exam   Constitutional: She is oriented to person, place, and time. She appears well-developed and well-nourished. No distress. Neck: Neck supple. Carotid bruit is not present. Cardiovascular: Normal rate, regular rhythm and normal heart sounds. Exam reveals no gallop. No murmur heard. Pulmonary/Chest: Effort normal and breath sounds normal. She has no wheezes. She has no rales. Abdominal: Soft. Bowel sounds are normal. She exhibits no distension. There is no tenderness. Musculoskeletal: She exhibits no edema. Neurological: She is alert and oriented to person, place, and time. Skin: Skin is warm and dry. Psychiatric:   +anxious affect   Vitals reviewed. Results for orders placed or performed in visit on 04/24/19   POCT glycosylated hemoglobin (Hb A1C)   Result Value Ref Range    Hemoglobin A1C 6.6 %         Amanda Carey was seen today for diabetes and insomnia. Diagnoses and all orders for this visit:    Type 2 diabetes mellitus without complication, with long-term current use of insulin ([facility])  -     POCT glycosylated hemoglobin (Hb A1C)  -     Insulin Pen Needle (B-D ULTRAFINE III SHORT PEN) 31G X 8 MM MISC; 1 each by Does not apply route daily  -     atorvastatin (LIPITOR) 20 MG tablet; Take 1 tablet by mouth daily  -     Disposable Gloves (LATEX GLOVES LARGE) MISC; Use daily as directed        -     Continue Basaglar and metformin    Primary insomnia  -     start temazepam (RESTORIL) 30 MG capsule; Take 1 capsule by mouth nightly as needed for Sleep. -     Brent Crowley MD, Sleep Medicine, [facility]        -     Discontinue Ambien      BMI was elevated today, and weight loss plan recommended is : conventional weight loss. Phone/MyChart follow up if tests abnormal.    Return in about 3 months (around 7/24/2019) for diabetes. I have reviewed my findings and recommendations with [facility] AND [facility].     Dana iVllegas M.D

## 2019-04-24 NOTE — PATIENT INSTRUCTIONS
Patient Education        Learning About Meal Planning for Diabetes  Why plan your meals? Meal planning can be a key part of managing diabetes. Planning meals and snacks with the right balance of carbohydrate, protein, and fat can help you keep your blood sugar at the target level you set with your doctor. You don't have to eat special foods. You can eat what your family eats, including sweets once in a while. But you do have to pay attention to how often you eat and how much you eat of certain foods. You may want to work with a dietitian or a certified diabetes educator. He or she can give you tips and meal ideas and can answer your questions about meal planning. This health professional can also help you reach a healthy weight if that is one of your goals. What plan is right for you? Your dietitian or diabetes educator may suggest that you start with the plate format or carbohydrate counting. The plate format  The plate format is a simple way to help you manage how you eat. You plan meals by learning how much space each food should take on a plate. Using the plate format helps you spread carbohydrate throughout the day. It can make it easier to keep your blood sugar level within your target range. It also helps you see if you're eating healthy portion sizes. To use the plate format, you put non-starchy vegetables on half your plate. Add meat or meat substitutes on one-quarter of the plate. Put a grain or starchy vegetable (such as brown rice or a potato) on the final quarter of the plate. You can add a small piece of fruit and some low-fat or fat-free milk or yogurt, depending on your carbohydrate goal for each meal.  Here are some tips for using the plate format:  · Make sure that you are not using an oversized plate. A 9-inch plate is best. Many restaurants use larger plates. · Get used to using the plate format at home. Then you can use it when you eat out.   · Write down your questions about using the plate format. Talk to your doctor, a dietitian, or a diabetes educator about your concerns. Carbohydrate counting  With carbohydrate counting, you plan meals based on the amount of carbohydrate in each food. Carbohydrate raises blood sugar higher and more quickly than any other nutrient. It is found in desserts, breads and cereals, and fruit. It's also found in starchy vegetables such as potatoes and corn, grains such as rice and pasta, and milk and yogurt. Spreading carbohydrate throughout the day helps keep your blood sugar levels within your target range. Your daily amount depends on several things, including your weight, how active you are, which diabetes medicines you take, and what your goals are for your blood sugar levels. A registered dietitian or diabetes educator can help you plan how much carbohydrate to include in each meal and snack. A guideline for your daily amount of carbohydrate is:  · 45 to 60 grams at each meal. That's about the same as 3 to 4 carbohydrate servings. · 15 to 20 grams at each snack. That's about the same as 1 carbohydrate serving. The Nutrition Facts label on packaged foods tells you how much carbohydrate is in a serving of the food. First, look at the serving size on the food label. Is that the amount you eat in a serving? All of the nutrition information on a food label is based on that serving size. So if you eat more or less than that, you'll need to adjust the other numbers. Total carbohydrate is the next thing you need to look for on the label. If you count carbohydrate servings, one serving of carbohydrate is 15 grams. For foods that don't come with labels, such as fresh fruits and vegetables, you'll need a guide that lists carbohydrate in these foods. Ask your doctor, dietitian, or diabetes educator about books or other nutrition guides you can use.   If you take insulin, you need to know how many grams of carbohydrate are in a meal. This lets you know how much Version: 11.9  © 9588-4850 [employer]. Care instructions adapted under license by [facility] ([facility]). If you have questions about a medical condition or this instruction, always ask your healthcare professional. [address] any warranty or liability for your use of this information.

## 2019-05-24 ENCOUNTER — HOSPITAL ENCOUNTER (OUTPATIENT)
Age: 49
Discharge: HOME | End: 2019-05-24
Payer: COMMERCIAL

## 2019-05-24 LAB
ALBUMIN SERPL-MCNC: 3.5 G/DL (ref 3.5–5.2)
ALCOHOL URINE: NOT DETECTED MG/DL
ALP BLD-CCNC: 75 U/L (ref 35–104)
ALT SERPL-CCNC: 79 U/L (ref 0–32)
AMPHETAMINE SCREEN, URINE: NOT DETECTED
ANION GAP SERPL CALCULATED.3IONS-SCNC: 11 MMOL/L (ref 7–16)
AST SERPL-CCNC: 81 U/L (ref 0–31)
BARBITURATE SCREEN URINE: NOT DETECTED
BASOPHILS ABSOLUTE: 0.07 E9/L (ref 0–0.2)
BASOPHILS RELATIVE PERCENT: 1 % (ref 0–2)
BENZODIAZEPINE SCREEN, URINE: POSITIVE
BILIRUB SERPL-MCNC: 0.4 MG/DL (ref 0–1.2)
BUN BLDV-MCNC: 9 MG/DL (ref 6–20)
CALCIUM SERPL-MCNC: 9.1 MG/DL (ref 8.6–10.2)
CANNABINOID SCREEN URINE: NOT DETECTED
CHLORIDE BLD-SCNC: 101 MMOL/L (ref 98–107)
CO2: 25 MMOL/L (ref 22–29)
COCAINE METABOLITE SCREEN URINE: NOT DETECTED
CREAT SERPL-MCNC: 0.9 MG/DL (ref 0.5–1)
EOSINOPHILS ABSOLUTE: 0.23 E9/L (ref 0.05–0.5)
EOSINOPHILS RELATIVE PERCENT: 3.2 % (ref 0–6)
GFR AFRICAN AMERICAN: >60
GFR NON-AFRICAN AMERICAN: >60 ML/MIN/1.73
GLUCOSE BLD-MCNC: 148 MG/DL (ref 74–99)
HCT VFR BLD CALC: 47.7 % (ref 34–48)
HEMOGLOBIN: 15.1 G/DL (ref 11.5–15.5)
IMMATURE GRANULOCYTES #: 0.02 E9/L
IMMATURE GRANULOCYTES %: 0.3 % (ref 0–5)
INR BLD: 1
LYMPHOCYTES ABSOLUTE: 3.02 E9/L (ref 1.5–4)
LYMPHOCYTES RELATIVE PERCENT: 41.9 % (ref 20–42)
MCH RBC QN AUTO: 30.7 PG (ref 26–35)
MCHC RBC AUTO-ENTMCNC: 31.7 % (ref 32–34.5)
MCV RBC AUTO: 97 FL (ref 80–99.9)
METHADONE SCREEN, URINE: NOT DETECTED
MONOCYTES ABSOLUTE: 0.51 E9/L (ref 0.1–0.95)
MONOCYTES RELATIVE PERCENT: 7.1 % (ref 2–12)
NEUTROPHILS ABSOLUTE: 3.35 E9/L (ref 1.8–7.3)
NEUTROPHILS RELATIVE PERCENT: 46.5 % (ref 43–80)
OPIATE SCREEN URINE: NOT DETECTED
PDW BLD-RTO: 12.7 FL (ref 11.5–15)
PHENCYCLIDINE SCREEN URINE: NOT DETECTED
PLATELET # BLD: 204 E9/L (ref 130–450)
PMV BLD AUTO: 10.4 FL (ref 7–12)
POTASSIUM SERPL-SCNC: 4.4 MMOL/L (ref 3.5–5)
PROPOXYPHENE SCREEN: NOT DETECTED
PROTHROMBIN TIME: 11.7 SEC (ref 9.3–12.4)
RBC # BLD: 4.92 E12/L (ref 3.5–5.5)
SODIUM BLD-SCNC: 137 MMOL/L (ref 132–146)
TOTAL PROTEIN: 7.5 G/DL (ref 6.4–8.3)
WBC # BLD: 7.2 E9/L (ref 4.5–11.5)

## 2019-05-24 PROCEDURE — 87902 NFCT AGT GNTYP ALYS HEP C: CPT

## 2019-05-24 PROCEDURE — 85610 PROTHROMBIN TIME: CPT

## 2019-05-24 PROCEDURE — 36415 COLL VENOUS BLD VENIPUNCTURE: CPT

## 2019-05-24 PROCEDURE — 80307 DRUG TEST PRSMV CHEM ANLYZR: CPT

## 2019-05-24 PROCEDURE — 87522 HEPATITIS C REVRS TRNSCRPJ: CPT

## 2019-05-24 PROCEDURE — 85025 COMPLETE CBC W/AUTO DIFF WBC: CPT

## 2019-05-24 PROCEDURE — 80074 ACUTE HEPATITIS PANEL: CPT

## 2019-05-24 PROCEDURE — 80053 COMPREHEN METABOLIC PANEL: CPT

## 2019-05-24 PROCEDURE — G0480 DRUG TEST DEF 1-7 CLASSES: HCPCS

## 2019-05-27 LAB
HCV QNT BY NAAT IU/ML: ABNORMAL IU/ML
HCV QNT BY NAAT LOG IU/ML: 6.85 LOG IU/ML
INTERPRETATION: DETECTED

## 2019-05-29 LAB
HAV IGM SER IA-ACNC: ABNORMAL
HEPATITIS B CORE IGM ANTIBODY: ABNORMAL
HEPATITIS B SURFACE ANTIGEN INTERPRETATION: ABNORMAL
HEPATITIS C ANTIBODY INTERPRETATION: REACTIVE

## 2019-05-30 LAB — HEPATITIS C GENOTYPE: NORMAL

## 2019-06-02 LAB
7-AMINOCLONAZEPAM, URINE: 578 NG/ML
ALPHA-HYDROXYALPRAZOLAM, URINE: <5 NG/ML
ALPHA-HYDROXYMIDAZOLAM, URINE: <20 NG/ML
ALPRAZOLAM, URINE: <5 NG/ML
CHLORDIAZEPOXIDE, URINE: <20 NG/ML
CLONAZEPAM, URINE: 13 NG/ML
DIAZEPAM, URINE: <20 NG/ML
LORAZEPAM, URINE: <20 NG/ML
MIDAZOLAM, URINE: <20 NG/ML
NORDIAZEPAM, URINE: <20 NG/ML
OXAZEPAM, URINE: 982 NG/ML
TEMAZEPAM, URINE: >4000 NG/ML

## 2019-06-07 ENCOUNTER — TELEPHONE (OUTPATIENT)
Dept: FAMILY MEDICINE CLINIC | Age: 49
End: 2019-06-07

## 2019-06-07 NOTE — TELEPHONE ENCOUNTER
Patient is getting cloazepam From Dr Christianson Hum  She takes this nightly. And you have her taking Temazepam nightly.   The pharmacy wants to make sure you are aware and if you are ok with this

## 2019-06-13 NOTE — TELEPHONE ENCOUNTER
I was not aware she took the Klonopin at night, but knew he prescribed it for her.  They can still fill the temazepam.

## 2019-07-05 DIAGNOSIS — Z79.4 TYPE 2 DIABETES MELLITUS WITH HYPERGLYCEMIA, WITH LONG-TERM CURRENT USE OF INSULIN (HCC): ICD-10-CM

## 2019-07-05 DIAGNOSIS — E11.65 TYPE 2 DIABETES MELLITUS WITH HYPERGLYCEMIA, WITH LONG-TERM CURRENT USE OF INSULIN (HCC): ICD-10-CM

## 2019-07-30 ENCOUNTER — HOSPITAL ENCOUNTER (OUTPATIENT)
Age: 49
Discharge: HOME | End: 2019-07-30
Payer: COMMERCIAL

## 2019-07-30 LAB
ALBUMIN SERPL-MCNC: 3.9 G/DL (ref 3.5–5.2)
ALCOHOL URINE: NOT DETECTED MG/DL
ALP BLD-CCNC: 81 U/L (ref 35–104)
ALT SERPL-CCNC: 230 U/L (ref 0–32)
AMPHETAMINE SCREEN, URINE: NOT DETECTED
ANION GAP SERPL CALCULATED.3IONS-SCNC: 12 MMOL/L (ref 7–16)
AST SERPL-CCNC: 192 U/L (ref 0–31)
BARBITURATE SCREEN URINE: NOT DETECTED
BASOPHILS ABSOLUTE: 0.08 E9/L (ref 0–0.2)
BASOPHILS RELATIVE PERCENT: 0.8 % (ref 0–2)
BENZODIAZEPINE SCREEN, URINE: POSITIVE
BILIRUB SERPL-MCNC: 0.7 MG/DL (ref 0–1.2)
BUN BLDV-MCNC: 14 MG/DL (ref 6–20)
CALCIUM SERPL-MCNC: 9.6 MG/DL (ref 8.6–10.2)
CANNABINOID SCREEN URINE: NOT DETECTED
CHLORIDE BLD-SCNC: 102 MMOL/L (ref 98–107)
CO2: 23 MMOL/L (ref 22–29)
COCAINE METABOLITE SCREEN URINE: NOT DETECTED
CREAT SERPL-MCNC: 1 MG/DL (ref 0.5–1)
EOSINOPHILS ABSOLUTE: 0.34 E9/L (ref 0.05–0.5)
EOSINOPHILS RELATIVE PERCENT: 3.2 % (ref 0–6)
GFR AFRICAN AMERICAN: >60
GFR NON-AFRICAN AMERICAN: 59 ML/MIN/1.73
GLUCOSE FASTING: 229 MG/DL (ref 74–99)
HCT VFR BLD CALC: 47.9 % (ref 34–48)
HEMOGLOBIN: 15.6 G/DL (ref 11.5–15.5)
IMMATURE GRANULOCYTES #: 0.04 E9/L
IMMATURE GRANULOCYTES %: 0.4 % (ref 0–5)
INR BLD: 1
LYMPHOCYTES ABSOLUTE: 3.97 E9/L (ref 1.5–4)
LYMPHOCYTES RELATIVE PERCENT: 37.7 % (ref 20–42)
MCH RBC QN AUTO: 32.1 PG (ref 26–35)
MCHC RBC AUTO-ENTMCNC: 32.6 % (ref 32–34.5)
MCV RBC AUTO: 98.6 FL (ref 80–99.9)
METHADONE SCREEN, URINE: NOT DETECTED
MONOCYTES ABSOLUTE: 0.72 E9/L (ref 0.1–0.95)
MONOCYTES RELATIVE PERCENT: 6.8 % (ref 2–12)
NEUTROPHILS ABSOLUTE: 5.39 E9/L (ref 1.8–7.3)
NEUTROPHILS RELATIVE PERCENT: 51.1 % (ref 43–80)
OPIATE SCREEN URINE: NOT DETECTED
PDW BLD-RTO: 13.4 FL (ref 11.5–15)
PHENCYCLIDINE SCREEN URINE: NOT DETECTED
PLATELET # BLD: 193 E9/L (ref 130–450)
PMV BLD AUTO: 10.7 FL (ref 7–12)
POTASSIUM SERPL-SCNC: 4.2 MMOL/L (ref 3.5–5)
PROPOXYPHENE SCREEN: NOT DETECTED
PROTHROMBIN TIME: 11.7 SEC (ref 9.3–12.4)
RBC # BLD: 4.86 E12/L (ref 3.5–5.5)
SODIUM BLD-SCNC: 137 MMOL/L (ref 132–146)
TOTAL PROTEIN: 8.1 G/DL (ref 6.4–8.3)
WBC # BLD: 10.5 E9/L (ref 4.5–11.5)

## 2019-07-30 PROCEDURE — 87902 NFCT AGT GNTYP ALYS HEP C: CPT

## 2019-07-30 PROCEDURE — 80074 ACUTE HEPATITIS PANEL: CPT

## 2019-07-30 PROCEDURE — 36415 COLL VENOUS BLD VENIPUNCTURE: CPT

## 2019-07-30 PROCEDURE — 87522 HEPATITIS C REVRS TRNSCRPJ: CPT

## 2019-07-30 PROCEDURE — 85025 COMPLETE CBC W/AUTO DIFF WBC: CPT

## 2019-07-30 PROCEDURE — 80307 DRUG TEST PRSMV CHEM ANLYZR: CPT

## 2019-07-30 PROCEDURE — 85610 PROTHROMBIN TIME: CPT

## 2019-07-30 PROCEDURE — 80053 COMPREHEN METABOLIC PANEL: CPT

## 2019-07-30 PROCEDURE — G0480 DRUG TEST DEF 1-7 CLASSES: HCPCS

## 2019-07-31 ENCOUNTER — OFFICE VISIT (OUTPATIENT)
Dept: SLEEP MEDICINE | Age: 49
End: 2019-07-31
Payer: COMMERCIAL

## 2019-07-31 VITALS
HEART RATE: 72 BPM | WEIGHT: 241 LBS | BODY MASS INDEX: 35.7 KG/M2 | OXYGEN SATURATION: 98 % | HEIGHT: 69 IN | RESPIRATION RATE: 16 BRPM

## 2019-07-31 DIAGNOSIS — E04.9 GOITER: ICD-10-CM

## 2019-07-31 DIAGNOSIS — G47.33 OBSTRUCTIVE SLEEP APNEA: Primary | ICD-10-CM

## 2019-07-31 DIAGNOSIS — F51.05 INSOMNIA DUE TO OTHER MENTAL DISORDER: ICD-10-CM

## 2019-07-31 DIAGNOSIS — E66.9 OBESITY WITH SERIOUS COMORBIDITY, UNSPECIFIED CLASSIFICATION, UNSPECIFIED OBESITY TYPE: ICD-10-CM

## 2019-07-31 DIAGNOSIS — F41.9 ANXIETY: ICD-10-CM

## 2019-07-31 DIAGNOSIS — F99 INSOMNIA DUE TO OTHER MENTAL DISORDER: ICD-10-CM

## 2019-07-31 PROBLEM — F41.1 ANXIETY STATE: Status: ACTIVE | Noted: 2019-07-31

## 2019-07-31 PROCEDURE — 99205 OFFICE O/P NEW HI 60 MIN: CPT | Performed by: INTERNAL MEDICINE

## 2019-07-31 RX ORDER — CLONAZEPAM 0.5 MG/1
2 TABLET ORAL NIGHTLY
Refills: 1 | COMMUNITY
Start: 2019-07-20

## 2019-07-31 ASSESSMENT — ENCOUNTER SYMPTOMS
TROUBLE SWALLOWING: 0
SHORTNESS OF BREATH: 0

## 2019-07-31 NOTE — PATIENT INSTRUCTIONS
nicotine builds in the body, it produces an effect similar to caffeine.  Nicotine should be avoided near bedtime and during the night. Dont smoke to get yourself back to sleep. 4. Alcohol: Avoid Alcohol After Dinner   Alcohol often promotes the onset of sleep, but as alcohol is metabolized during the night, sleep becomes disturbed and fragmented, leading to poor sleep quality.  Limit alcohol use to 1 beer or glass of wine per day for women, 2  drinks per day for men. 5. Sleeping Pills: Sleep Medications are Effective Only Temporarily   Research has shown that sleep medications lose their effectiveness in about 2 - 4 weeks when taken regularly.  Over time, sleeping pills actually can make sleep problems worse. When sleeping pills have been used over a long period, withdrawal from the medication can lead to an insomnia rebound. Thus, after long- term use, many individuals incorrectly conclude that they need sleeping pills in order to sleep normally.  Keep use of sleep meds infrequent, but dont worry if you need to use one on an occasional basis. (And always consult with your doctor first if you decide to make changes to your medication regimen.)     6. Regular Exercise   Exercise has been shown to aid sleep, although the positive effect often takes several weeks to become noticeable.  However, exercise earlier in the day if possible. Exercise within 2 hours of bedtime may elevate nervous system activity and interfere with sleep onset.  Get regular exercise, preferably at least 20 minutes each day of an activity that causes sweating. 7. Hot Baths   Spending 20 minutes in a tub of hot water an hour or two prior to bedtime may promote sleep. 8.Bedroom Environment: Moderate Temperature, Quiet, and Dark   Extremes of heat or cold can disrupt sleep. Keep your room at a comfortable temperature.    Noises can be masked with background white noise (such as the noise of a fan) or with
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
## 2019-08-02 LAB
HCV QNT BY NAAT IU/ML: ABNORMAL IU/ML
HCV QNT BY NAAT LOG IU/ML: 6.32 LOG IU/ML
INTERPRETATION: DETECTED

## 2019-08-03 LAB
7-AMINOCLONAZEPAM, URINE: >1000 NG/ML
ALPHA-HYDROXYALPRAZOLAM, URINE: <5 NG/ML
ALPHA-HYDROXYMIDAZOLAM, URINE: <20 NG/ML
ALPRAZOLAM, URINE: <5 NG/ML
CHLORDIAZEPOXIDE, URINE: <20 NG/ML
CLONAZEPAM, URINE: 27 NG/ML
DIAZEPAM, URINE: <20 NG/ML
LORAZEPAM, URINE: <20 NG/ML
MIDAZOLAM, URINE: <20 NG/ML
NORDIAZEPAM, URINE: <20 NG/ML
OXAZEPAM, URINE: <20 NG/ML
TEMAZEPAM, URINE: <20 NG/ML

## 2019-08-05 LAB — HEPATITIS C GENOTYPE: NORMAL

## 2019-08-13 ENCOUNTER — OFFICE VISIT (OUTPATIENT)
Dept: FAMILY MEDICINE CLINIC | Age: 49
End: 2019-08-13
Payer: COMMERCIAL

## 2019-08-13 VITALS
BODY MASS INDEX: 36.14 KG/M2 | HEART RATE: 91 BPM | SYSTOLIC BLOOD PRESSURE: 124 MMHG | RESPIRATION RATE: 20 BRPM | OXYGEN SATURATION: 96 % | WEIGHT: 244 LBS | DIASTOLIC BLOOD PRESSURE: 74 MMHG | HEIGHT: 69 IN

## 2019-08-13 DIAGNOSIS — Z79.4 TYPE 2 DIABETES MELLITUS WITH HYPERGLYCEMIA, WITH LONG-TERM CURRENT USE OF INSULIN (HCC): Primary | ICD-10-CM

## 2019-08-13 DIAGNOSIS — E11.65 TYPE 2 DIABETES MELLITUS WITH HYPERGLYCEMIA, WITH LONG-TERM CURRENT USE OF INSULIN (HCC): Primary | ICD-10-CM

## 2019-08-13 DIAGNOSIS — F51.04 PSYCHOPHYSIOLOGICAL INSOMNIA: ICD-10-CM

## 2019-08-13 DIAGNOSIS — Z79.4 TYPE 2 DIABETES MELLITUS WITHOUT COMPLICATION, WITH LONG-TERM CURRENT USE OF INSULIN (HCC): ICD-10-CM

## 2019-08-13 DIAGNOSIS — F31.32 BIPOLAR AFFECTIVE DISORDER, CURRENTLY DEPRESSED, MODERATE (HCC): ICD-10-CM

## 2019-08-13 DIAGNOSIS — E11.9 TYPE 2 DIABETES MELLITUS WITHOUT COMPLICATION, WITH LONG-TERM CURRENT USE OF INSULIN (HCC): ICD-10-CM

## 2019-08-13 LAB — HBA1C MFR BLD: 8.8 %

## 2019-08-13 PROCEDURE — G8427 DOCREV CUR MEDS BY ELIG CLIN: HCPCS | Performed by: FAMILY MEDICINE

## 2019-08-13 PROCEDURE — 1036F TOBACCO NON-USER: CPT | Performed by: FAMILY MEDICINE

## 2019-08-13 PROCEDURE — 2022F DILAT RTA XM EVC RTNOPTHY: CPT | Performed by: FAMILY MEDICINE

## 2019-08-13 PROCEDURE — 99214 OFFICE O/P EST MOD 30 MIN: CPT | Performed by: FAMILY MEDICINE

## 2019-08-13 PROCEDURE — 3045F PR MOST RECENT HEMOGLOBIN A1C LEVEL 7.0-9.0%: CPT | Performed by: FAMILY MEDICINE

## 2019-08-13 PROCEDURE — G8417 CALC BMI ABV UP PARAM F/U: HCPCS | Performed by: FAMILY MEDICINE

## 2019-08-13 PROCEDURE — 3017F COLORECTAL CA SCREEN DOC REV: CPT | Performed by: FAMILY MEDICINE

## 2019-08-13 PROCEDURE — 83036 HEMOGLOBIN GLYCOSYLATED A1C: CPT | Performed by: FAMILY MEDICINE

## 2019-08-13 RX ORDER — IBUPROFEN 800 MG/1
800 TABLET ORAL 3 TIMES DAILY PRN
Qty: 90 TABLET | Refills: 3 | Status: SHIPPED | OUTPATIENT
Start: 2019-08-13 | End: 2019-11-27 | Stop reason: SDUPTHER

## 2019-08-13 RX ORDER — METFORMIN HYDROCHLORIDE 500 MG/1
500 TABLET, EXTENDED RELEASE ORAL
Qty: 30 TABLET | Refills: 5 | Status: SHIPPED | OUTPATIENT
Start: 2019-08-13 | End: 2020-01-13 | Stop reason: SDUPTHER

## 2019-08-13 ASSESSMENT — ENCOUNTER SYMPTOMS
DIARRHEA: 0
NAUSEA: 0
VOMITING: 0
BLOOD IN STOOL: 0
SHORTNESS OF BREATH: 0

## 2019-08-13 ASSESSMENT — PATIENT HEALTH QUESTIONNAIRE - PHQ9
1. LITTLE INTEREST OR PLEASURE IN DOING THINGS: 0
SUM OF ALL RESPONSES TO PHQ QUESTIONS 1-9: 0
2. FEELING DOWN, DEPRESSED OR HOPELESS: 0
SUM OF ALL RESPONSES TO PHQ9 QUESTIONS 1 & 2: 0
SUM OF ALL RESPONSES TO PHQ QUESTIONS 1-9: 0

## 2019-08-13 NOTE — PATIENT INSTRUCTIONS
the plate format. Talk to your doctor, a dietitian, or a diabetes educator about your concerns. Carbohydrate counting  With carbohydrate counting, you plan meals based on the amount of carbohydrate in each food. Carbohydrate raises blood sugar higher and more quickly than any other nutrient. It is found in desserts, breads and cereals, and fruit. It's also found in starchy vegetables such as potatoes and corn, grains such as rice and pasta, and milk and yogurt. Spreading carbohydrate throughout the day helps keep your blood sugar levels within your target range. Your daily amount depends on several things, including your weight, how active you are, which diabetes medicines you take, and what your goals are for your blood sugar levels. A registered dietitian or diabetes educator can help you plan how much carbohydrate to include in each meal and snack. A guideline for your daily amount of carbohydrate is:  · 45 to 60 grams at each meal. That's about the same as 3 to 4 carbohydrate servings. · 15 to 20 grams at each snack. That's about the same as 1 carbohydrate serving. The Nutrition Facts label on packaged foods tells you how much carbohydrate is in a serving of the food. First, look at the serving size on the food label. Is that the amount you eat in a serving? All of the nutrition information on a food label is based on that serving size. So if you eat more or less than that, you'll need to adjust the other numbers. Total carbohydrate is the next thing you need to look for on the label. If you count carbohydrate servings, one serving of carbohydrate is 15 grams. For foods that don't come with labels, such as fresh fruits and vegetables, you'll need a guide that lists carbohydrate in these foods. Ask your doctor, dietitian, or diabetes educator about books or other nutrition guides you can use.   If you take insulin, you need to know how many grams of carbohydrate are in a meal. This lets you know how much Version: 12.1  © 2391-2232 Healthwise, Incorporated. Care instructions adapted under license by [facility] ([facility]). If you have questions about a medical condition or this instruction, always ask your healthcare professional. [address] any warranty or liability for your use of this information.

## 2019-08-15 RX ORDER — PEN NEEDLE, DIABETIC 31 GX5/16"
NEEDLE, DISPOSABLE MISCELLANEOUS
Qty: 100 EACH | Refills: 2 | Status: SHIPPED
Start: 2019-08-15 | End: 2022-06-07 | Stop reason: SDUPTHER

## 2019-09-01 DIAGNOSIS — Z79.4 TYPE 2 DIABETES MELLITUS WITH HYPERGLYCEMIA, WITH LONG-TERM CURRENT USE OF INSULIN (HCC): ICD-10-CM

## 2019-09-01 DIAGNOSIS — E11.65 TYPE 2 DIABETES MELLITUS WITH HYPERGLYCEMIA, WITH LONG-TERM CURRENT USE OF INSULIN (HCC): ICD-10-CM

## 2019-09-03 RX ORDER — UBIQUINOL 100 MG
CAPSULE ORAL
Qty: 60 EACH | Refills: 11 | Status: SHIPPED
Start: 2019-09-03 | End: 2020-07-29 | Stop reason: SDUPTHER

## 2019-10-21 ENCOUNTER — OFFICE VISIT (OUTPATIENT)
Dept: FAMILY MEDICINE CLINIC | Age: 49
End: 2019-10-21
Payer: COMMERCIAL

## 2019-10-21 VITALS
BODY MASS INDEX: 35.1 KG/M2 | OXYGEN SATURATION: 99 % | HEART RATE: 115 BPM | RESPIRATION RATE: 20 BRPM | WEIGHT: 237 LBS | SYSTOLIC BLOOD PRESSURE: 132 MMHG | DIASTOLIC BLOOD PRESSURE: 84 MMHG | HEIGHT: 69 IN

## 2019-10-21 DIAGNOSIS — E11.65 TYPE 2 DIABETES MELLITUS WITH HYPERGLYCEMIA, WITH LONG-TERM CURRENT USE OF INSULIN (HCC): Primary | ICD-10-CM

## 2019-10-21 DIAGNOSIS — B18.2 CHRONIC HEPATITIS C WITHOUT HEPATIC COMA (HCC): ICD-10-CM

## 2019-10-21 DIAGNOSIS — R00.2 PALPITATIONS: ICD-10-CM

## 2019-10-21 DIAGNOSIS — Z79.4 TYPE 2 DIABETES MELLITUS WITH HYPERGLYCEMIA, WITH LONG-TERM CURRENT USE OF INSULIN (HCC): Primary | ICD-10-CM

## 2019-10-21 LAB
CREATININE URINE POCT: NORMAL
HBA1C MFR BLD: 9 %
MICROALBUMIN/CREAT 24H UR: NORMAL MG/G{CREAT}
MICROALBUMIN/CREAT UR-RTO: NORMAL

## 2019-10-21 PROCEDURE — 99214 OFFICE O/P EST MOD 30 MIN: CPT | Performed by: FAMILY MEDICINE

## 2019-10-21 PROCEDURE — G8427 DOCREV CUR MEDS BY ELIG CLIN: HCPCS | Performed by: FAMILY MEDICINE

## 2019-10-21 PROCEDURE — 2022F DILAT RTA XM EVC RTNOPTHY: CPT | Performed by: FAMILY MEDICINE

## 2019-10-21 PROCEDURE — 93000 ELECTROCARDIOGRAM COMPLETE: CPT | Performed by: FAMILY MEDICINE

## 2019-10-21 PROCEDURE — G8417 CALC BMI ABV UP PARAM F/U: HCPCS | Performed by: FAMILY MEDICINE

## 2019-10-21 PROCEDURE — 3017F COLORECTAL CA SCREEN DOC REV: CPT | Performed by: FAMILY MEDICINE

## 2019-10-21 PROCEDURE — 82044 UR ALBUMIN SEMIQUANTITATIVE: CPT | Performed by: FAMILY MEDICINE

## 2019-10-21 PROCEDURE — G8484 FLU IMMUNIZE NO ADMIN: HCPCS | Performed by: FAMILY MEDICINE

## 2019-10-21 PROCEDURE — 83036 HEMOGLOBIN GLYCOSYLATED A1C: CPT | Performed by: FAMILY MEDICINE

## 2019-10-21 PROCEDURE — 1036F TOBACCO NON-USER: CPT | Performed by: FAMILY MEDICINE

## 2019-10-21 RX ORDER — BLOOD-GLUCOSE METER
EACH MISCELLANEOUS
Qty: 1 KIT | Refills: 0 | Status: SHIPPED
Start: 2019-10-21 | End: 2021-03-15

## 2019-10-21 RX ORDER — LITHIUM CARBONATE 450 MG
TABLET, EXTENDED RELEASE ORAL
Refills: 1 | COMMUNITY
Start: 2019-09-30

## 2019-10-21 ASSESSMENT — ENCOUNTER SYMPTOMS
NAUSEA: 0
DIARRHEA: 1
VOMITING: 0
SHORTNESS OF BREATH: 0

## 2019-10-23 ENCOUNTER — TELEPHONE (OUTPATIENT)
Dept: FAMILY MEDICINE CLINIC | Age: 49
End: 2019-10-23

## 2019-11-08 ENCOUNTER — TELEPHONE (OUTPATIENT)
Dept: FAMILY MEDICINE CLINIC | Age: 49
End: 2019-11-08

## 2019-11-11 RX ORDER — ROSUVASTATIN CALCIUM 10 MG/1
10 TABLET, COATED ORAL DAILY
Qty: 30 TABLET | Refills: 5 | Status: SHIPPED
Start: 2019-11-11 | End: 2020-05-11

## 2019-11-13 ENCOUNTER — TELEPHONE (OUTPATIENT)
Dept: SLEEP MEDICINE | Age: 49
End: 2019-11-13

## 2019-11-15 ENCOUNTER — HOSPITAL ENCOUNTER (EMERGENCY)
Age: 49
Discharge: HOME | End: 2019-11-15
Attending: EMERGENCY MEDICINE
Payer: COMMERCIAL

## 2019-11-15 VITALS
RESPIRATION RATE: 20 BRPM | HEART RATE: 97 BPM | HEIGHT: 69 IN | WEIGHT: 240 LBS | BODY MASS INDEX: 35.55 KG/M2 | SYSTOLIC BLOOD PRESSURE: 152 MMHG | OXYGEN SATURATION: 97 % | DIASTOLIC BLOOD PRESSURE: 77 MMHG | TEMPERATURE: 98.1 F

## 2019-11-15 DIAGNOSIS — R53.83 FATIGUE, UNSPECIFIED TYPE: ICD-10-CM

## 2019-11-15 DIAGNOSIS — R73.9 HYPERGLYCEMIA: ICD-10-CM

## 2019-11-15 DIAGNOSIS — R42 DIZZINESS: Primary | ICD-10-CM

## 2019-11-15 LAB
ALBUMIN SERPL-MCNC: 4 G/DL (ref 3.5–5.2)
ALP BLD-CCNC: 85 U/L (ref 35–104)
ALT SERPL-CCNC: 25 U/L (ref 0–32)
ANION GAP SERPL CALCULATED.3IONS-SCNC: 15 MMOL/L (ref 7–16)
AST SERPL-CCNC: 25 U/L (ref 0–31)
BACTERIA: ABNORMAL /HPF
BETA-HYDROXYBUTYRATE: 0.11 MMOL/L (ref 0.02–0.27)
BILIRUB SERPL-MCNC: 0.4 MG/DL (ref 0–1.2)
BILIRUBIN URINE: ABNORMAL
BLOOD, URINE: NEGATIVE
BUN BLDV-MCNC: 10 MG/DL (ref 6–20)
CALCIUM SERPL-MCNC: 10 MG/DL (ref 8.6–10.2)
CHLORIDE BLD-SCNC: 98 MMOL/L (ref 98–107)
CLARITY: CLEAR
CO2: 24 MMOL/L (ref 22–29)
CO2: 26 MMOL/L (ref 22–29)
COLOR: ABNORMAL
CREAT SERPL-MCNC: 0.9 MG/DL (ref 0.5–1)
EPITHELIAL CELLS, UA: ABNORMAL /HPF
GFR AFRICAN AMERICAN: >60
GFR AFRICAN AMERICAN: >60
GFR NON-AFRICAN AMERICAN: >60 ML/MIN/1.73
GFR NON-AFRICAN AMERICAN: >60 ML/MIN/1.73
GLUCOSE BLD-MCNC: 163 MG/DL (ref 74–99)
GLUCOSE BLD-MCNC: 164 MG/DL (ref 74–99)
GLUCOSE URINE: NEGATIVE MG/DL
HCT VFR BLD CALC: 46.8 % (ref 34–48)
HEMOGLOBIN: 15.3 G/DL (ref 11.5–15.5)
KETONES, URINE: 15 MG/DL
LACTIC ACID: 2.5 MMOL/L (ref 0.5–2.2)
LEUKOCYTE ESTERASE, URINE: NEGATIVE
LIPASE: 35 U/L (ref 13–60)
LITHIUM DOSE AMOUNT: ABNORMAL
LITHIUM LEVEL: 0.36 MMOL/L (ref 0.5–1.5)
MCH RBC QN AUTO: 31.8 PG (ref 26–35)
MCHC RBC AUTO-ENTMCNC: 32.7 % (ref 32–34.5)
MCV RBC AUTO: 97.3 FL (ref 80–99.9)
MUCUS: PRESENT
NITRITE, URINE: NEGATIVE
PDW BLD-RTO: 12.3 FL (ref 11.5–15)
PH UA: 6 (ref 5–9)
PH VENOUS: 7.36 (ref 7.35–7.45)
PLATELET # BLD: 247 E9/L (ref 130–450)
PMV BLD AUTO: 10.7 FL (ref 7–12)
POC ANION GAP: 10 MMOL/L (ref 7–16)
POC BUN: 10 MG/DL (ref 8–23)
POC CHLORIDE: 102 MMOL/L (ref 100–108)
POC CREATININE: 0.8 MG/DL (ref 0.5–1)
POC POTASSIUM: 3.8 MMOL/L (ref 3.5–5)
POC SODIUM: 138 MMOL/L (ref 132–146)
POTASSIUM SERPL-SCNC: 3.9 MMOL/L (ref 3.5–5)
PROTEIN UA: ABNORMAL MG/DL
RBC # BLD: 4.81 E12/L (ref 3.5–5.5)
RBC UA: ABNORMAL /HPF (ref 0–2)
SODIUM BLD-SCNC: 137 MMOL/L (ref 132–146)
SPECIFIC GRAVITY UA: 1.02 (ref 1–1.03)
TOTAL PROTEIN: 7.7 G/DL (ref 6.4–8.3)
TROPONIN: <0.01 NG/ML (ref 0–0.03)
UROBILINOGEN, URINE: 0.2 E.U./DL
WBC # BLD: 12.6 E9/L (ref 4.5–11.5)
WBC UA: ABNORMAL /HPF (ref 0–5)

## 2019-11-15 PROCEDURE — 85027 COMPLETE CBC AUTOMATED: CPT

## 2019-11-15 PROCEDURE — 2580000003 HC RX 258: Performed by: EMERGENCY MEDICINE

## 2019-11-15 PROCEDURE — 84520 ASSAY OF UREA NITROGEN: CPT

## 2019-11-15 PROCEDURE — 80178 ASSAY OF LITHIUM: CPT

## 2019-11-15 PROCEDURE — 83605 ASSAY OF LACTIC ACID: CPT

## 2019-11-15 PROCEDURE — 82800 BLOOD PH: CPT

## 2019-11-15 PROCEDURE — 83690 ASSAY OF LIPASE: CPT

## 2019-11-15 PROCEDURE — 80053 COMPREHEN METABOLIC PANEL: CPT

## 2019-11-15 PROCEDURE — 82010 KETONE BODYS QUAN: CPT

## 2019-11-15 PROCEDURE — 84484 ASSAY OF TROPONIN QUANT: CPT

## 2019-11-15 PROCEDURE — 81001 URINALYSIS AUTO W/SCOPE: CPT

## 2019-11-15 PROCEDURE — 93005 ELECTROCARDIOGRAM TRACING: CPT | Performed by: EMERGENCY MEDICINE

## 2019-11-15 PROCEDURE — 82947 ASSAY GLUCOSE BLOOD QUANT: CPT

## 2019-11-15 PROCEDURE — 96374 THER/PROPH/DIAG INJ IV PUSH: CPT

## 2019-11-15 PROCEDURE — 36415 COLL VENOUS BLD VENIPUNCTURE: CPT

## 2019-11-15 PROCEDURE — 82565 ASSAY OF CREATININE: CPT

## 2019-11-15 PROCEDURE — 80051 ELECTROLYTE PANEL: CPT

## 2019-11-15 PROCEDURE — 87088 URINE BACTERIA CULTURE: CPT

## 2019-11-15 PROCEDURE — 99284 EMERGENCY DEPT VISIT MOD MDM: CPT

## 2019-11-15 PROCEDURE — 94760 N-INVAS EAR/PLS OXIMETRY 1: CPT

## 2019-11-15 PROCEDURE — 6360000002 HC RX W HCPCS: Performed by: EMERGENCY MEDICINE

## 2019-11-15 RX ORDER — SODIUM CHLORIDE 0.9 % (FLUSH) 0.9 %
10 SYRINGE (ML) INJECTION PRN
Status: DISCONTINUED | OUTPATIENT
Start: 2019-11-15 | End: 2019-11-15 | Stop reason: HOSPADM

## 2019-11-15 RX ORDER — 0.9 % SODIUM CHLORIDE 0.9 %
1000 INTRAVENOUS SOLUTION INTRAVENOUS ONCE
Status: COMPLETED | OUTPATIENT
Start: 2019-11-15 | End: 2019-11-15

## 2019-11-15 RX ORDER — LORAZEPAM 2 MG/ML
1 INJECTION INTRAMUSCULAR ONCE
Status: COMPLETED | OUTPATIENT
Start: 2019-11-15 | End: 2019-11-15

## 2019-11-15 RX ADMIN — SODIUM CHLORIDE 1000 ML: 9 INJECTION, SOLUTION INTRAVENOUS at 19:27

## 2019-11-15 RX ADMIN — LORAZEPAM 1 MG: 2 INJECTION INTRAMUSCULAR; INTRAVENOUS at 19:27

## 2019-11-15 ASSESSMENT — ENCOUNTER SYMPTOMS
SORE THROAT: 0
DIARRHEA: 1
BACK PAIN: 0
EYE REDNESS: 0
VOMITING: 0
EYE PAIN: 0
SHORTNESS OF BREATH: 0
COUGH: 0
EYE DISCHARGE: 0
ABDOMINAL DISTENTION: 0
SINUS PRESSURE: 0
WHEEZING: 0
NAUSEA: 0

## 2019-11-16 LAB
EKG ATRIAL RATE: 75 BPM
EKG P AXIS: 23 DEGREES
EKG P-R INTERVAL: 122 MS
EKG Q-T INTERVAL: 358 MS
EKG QRS DURATION: 78 MS
EKG QTC CALCULATION (BAZETT): 399 MS
EKG R AXIS: 17 DEGREES
EKG T AXIS: -2 DEGREES
EKG VENTRICULAR RATE: 75 BPM

## 2019-11-16 PROCEDURE — 93010 ELECTROCARDIOGRAM REPORT: CPT | Performed by: INTERNAL MEDICINE

## 2019-11-18 LAB — URINE CULTURE, ROUTINE: NORMAL

## 2019-11-22 ENCOUNTER — HOSPITAL ENCOUNTER (OUTPATIENT)
Age: 49
Discharge: HOME | End: 2019-11-22
Payer: COMMERCIAL

## 2019-11-22 LAB
ALBUMIN SERPL-MCNC: 3.9 G/DL (ref 3.5–5.2)
ALP BLD-CCNC: 68 U/L (ref 35–104)
ALT SERPL-CCNC: 18 U/L (ref 0–32)
ANION GAP SERPL CALCULATED.3IONS-SCNC: 12 MMOL/L (ref 7–16)
AST SERPL-CCNC: 17 U/L (ref 0–31)
BASOPHILS ABSOLUTE: 0.09 E9/L (ref 0–0.2)
BASOPHILS RELATIVE PERCENT: 0.8 % (ref 0–2)
BILIRUB SERPL-MCNC: 0.3 MG/DL (ref 0–1.2)
BUN BLDV-MCNC: 12 MG/DL (ref 6–20)
CALCIUM SERPL-MCNC: 10.1 MG/DL (ref 8.6–10.2)
CHLORIDE BLD-SCNC: 105 MMOL/L (ref 98–107)
CO2: 24 MMOL/L (ref 22–29)
CREAT SERPL-MCNC: 0.9 MG/DL (ref 0.5–1)
EOSINOPHILS ABSOLUTE: 0.41 E9/L (ref 0.05–0.5)
EOSINOPHILS RELATIVE PERCENT: 3.8 % (ref 0–6)
GFR AFRICAN AMERICAN: >60
GFR NON-AFRICAN AMERICAN: >60 ML/MIN/1.73
GLUCOSE FASTING: 153 MG/DL (ref 74–99)
HCT VFR BLD CALC: 45.6 % (ref 34–48)
HEMOGLOBIN: 14.9 G/DL (ref 11.5–15.5)
IMMATURE GRANULOCYTES #: 0.02 E9/L
IMMATURE GRANULOCYTES %: 0.2 % (ref 0–5)
LYMPHOCYTES ABSOLUTE: 4.53 E9/L (ref 1.5–4)
LYMPHOCYTES RELATIVE PERCENT: 41.4 % (ref 20–42)
MCH RBC QN AUTO: 31.7 PG (ref 26–35)
MCHC RBC AUTO-ENTMCNC: 32.7 % (ref 32–34.5)
MCV RBC AUTO: 97 FL (ref 80–99.9)
MONOCYTES ABSOLUTE: 0.81 E9/L (ref 0.1–0.95)
MONOCYTES RELATIVE PERCENT: 7.4 % (ref 2–12)
NEUTROPHILS ABSOLUTE: 5.07 E9/L (ref 1.8–7.3)
NEUTROPHILS RELATIVE PERCENT: 46.4 % (ref 43–80)
PDW BLD-RTO: 12.3 FL (ref 11.5–15)
PLATELET # BLD: 171 E9/L (ref 130–450)
PMV BLD AUTO: 10.6 FL (ref 7–12)
POTASSIUM SERPL-SCNC: 4.4 MMOL/L (ref 3.5–5)
RBC # BLD: 4.7 E12/L (ref 3.5–5.5)
SODIUM BLD-SCNC: 141 MMOL/L (ref 132–146)
TOTAL PROTEIN: 7.3 G/DL (ref 6.4–8.3)
WBC # BLD: 10.9 E9/L (ref 4.5–11.5)

## 2019-11-22 PROCEDURE — 36415 COLL VENOUS BLD VENIPUNCTURE: CPT

## 2019-11-22 PROCEDURE — 80053 COMPREHEN METABOLIC PANEL: CPT

## 2019-11-22 PROCEDURE — 87522 HEPATITIS C REVRS TRNSCRPJ: CPT

## 2019-11-22 PROCEDURE — 85025 COMPLETE CBC W/AUTO DIFF WBC: CPT

## 2019-11-26 LAB
HCV QNT BY NAAT IU/ML: NOT DETECTED IU/ML
HCV QNT BY NAAT LOG IU/ML: NOT DETECTED LOG IU/ML
INTERPRETATION: NOT DETECTED

## 2020-01-13 ENCOUNTER — OFFICE VISIT (OUTPATIENT)
Dept: FAMILY MEDICINE CLINIC | Age: 50
End: 2020-01-13
Payer: COMMERCIAL

## 2020-01-13 VITALS
DIASTOLIC BLOOD PRESSURE: 72 MMHG | SYSTOLIC BLOOD PRESSURE: 124 MMHG | WEIGHT: 228 LBS | RESPIRATION RATE: 20 BRPM | HEART RATE: 115 BPM | BODY MASS INDEX: 33.77 KG/M2 | OXYGEN SATURATION: 95 % | HEIGHT: 69 IN

## 2020-01-13 PROBLEM — F41.1 ANXIETY STATE: Status: RESOLVED | Noted: 2019-07-31 | Resolved: 2020-01-13

## 2020-01-13 LAB — HBA1C MFR BLD: 6.9 %

## 2020-01-13 PROCEDURE — G8417 CALC BMI ABV UP PARAM F/U: HCPCS | Performed by: FAMILY MEDICINE

## 2020-01-13 PROCEDURE — G8427 DOCREV CUR MEDS BY ELIG CLIN: HCPCS | Performed by: FAMILY MEDICINE

## 2020-01-13 PROCEDURE — 83036 HEMOGLOBIN GLYCOSYLATED A1C: CPT | Performed by: FAMILY MEDICINE

## 2020-01-13 PROCEDURE — 1036F TOBACCO NON-USER: CPT | Performed by: FAMILY MEDICINE

## 2020-01-13 PROCEDURE — 3017F COLORECTAL CA SCREEN DOC REV: CPT | Performed by: FAMILY MEDICINE

## 2020-01-13 PROCEDURE — 99214 OFFICE O/P EST MOD 30 MIN: CPT | Performed by: FAMILY MEDICINE

## 2020-01-13 PROCEDURE — 2022F DILAT RTA XM EVC RTNOPTHY: CPT | Performed by: FAMILY MEDICINE

## 2020-01-13 PROCEDURE — 3044F HG A1C LEVEL LT 7.0%: CPT | Performed by: FAMILY MEDICINE

## 2020-01-13 PROCEDURE — G8484 FLU IMMUNIZE NO ADMIN: HCPCS | Performed by: FAMILY MEDICINE

## 2020-01-13 RX ORDER — METFORMIN HYDROCHLORIDE 500 MG/1
1000 TABLET, EXTENDED RELEASE ORAL
Qty: 60 TABLET | Refills: 5 | Status: SHIPPED
Start: 2020-01-13 | End: 2020-07-16

## 2020-01-13 ASSESSMENT — ENCOUNTER SYMPTOMS
SHORTNESS OF BREATH: 1
VOMITING: 0
NAUSEA: 0
DIARRHEA: 0

## 2020-01-13 NOTE — PROGRESS NOTES
per week: None     Minutes per session: None    Stress: None   Relationships    Social connections:     Talks on phone: None     Gets together: None     Attends Anglican service: None     Active member of club or organization: None     Attends meetings of clubs or organizations: None     Relationship status: None    Intimate partner violence:     Fear of current or ex partner: None     Emotionally abused: None     Physically abused: None     Forced sexual activity: None   Other Topics Concern    None   Social History Narrative    None       I have reviewed Sanam's allergies, medications, problem list, medical, social and family history and have updated as needed in the electronic medical record    Current Outpatient Medications   Medication Sig Dispense Refill    metFORMIN (GLUCOPHAGE-XR) 500 MG extended release tablet Take 2 tablets by mouth daily (with breakfast) 60 tablet 5    ibuprofen (ADVIL;MOTRIN) 800 MG tablet TAKE ONE TABLET BY MOUTH THREE TIMES A DAY AS NEEDED FOR PAIN 90 tablet 3    medical marijuana Take by mouth as needed.  Glecaprevir-Pibrentasvir (MAVYRET) 100-40 MG TABS Take by mouth      rosuvastatin (CRESTOR) 10 MG tablet Take 1 tablet by mouth daily 30 tablet 5    lithium (ESKALITH) 450 MG extended release tablet TAKE 1 & 1/2 TABLETS BY MOUTH AT BEDTIME  1    ONE TOUCH LANCETS MISC Check blood sugar qam 50 each 12    Blood Glucose Monitoring Suppl (ONE TOUCH ULTRA 2) w/Device KIT Check blood sugar qam 1 kit 0    blood glucose test strips (ONE TOUCH ULTRA TEST) strip Check blood sugar qam 50 strip 12    Alcohol Swabs (ALCOHOL PREP) 70 % PADS USE AS DIRECTED 60 each 11    B-D ULTRAFINE III SHORT PEN 31G X 8 MM MISC use once daily 100 each 2    clonazePAM (KLONOPIN) 0.5 MG tablet Take 2 tablets by mouth nightly.   1    carbidopa-levodopa (SINEMET)  MG per tablet       Handicap Placard MISC by Does not apply route Unable to ambulate more than 20 feet without difficulty  Expires 1/31/2024 1 each 0    erythromycin with ethanol (THERAMYCIN) 2 % external solution Apply topically daily. 60 mL 3    lithium 300 MG capsule Take 300 mg by mouth daily      baclofen (LIORESAL) 10 MG tablet Take 1 tablet by mouth 3 times daily (Patient taking differently: Take 10 mg by mouth daily ) 10 tablet 0     No current facility-administered medications for this visit. Review Of Systems:    Review of Systems   Eyes: Negative for visual disturbance. Respiratory: Positive for shortness of breath. Cardiovascular: Positive for palpitations. Negative for chest pain and leg swelling. Gastrointestinal: Negative for diarrhea, nausea and vomiting. Genitourinary: Negative for difficulty urinating, dysuria and frequency. Skin: Positive for rash (comes and goes). Psychiatric/Behavioral: Positive for dysphoric mood. The patient is nervous/anxious. OBJECTIVE:     VS:  Wt Readings from Last 3 Encounters:   01/13/20 228 lb (103.4 kg)   11/15/19 240 lb (108.9 kg)   10/21/19 237 lb (107.5 kg)     Vitals:    01/13/20 1502   BP: 124/72   Pulse: 115   Resp: 20   SpO2: 95%       Physical Exam  Vitals signs reviewed. Constitutional:       General: She is not in acute distress. Appearance: She is well-developed. Neck:      Musculoskeletal: Neck supple. Vascular: No carotid bruit. Cardiovascular:      Rate and Rhythm: Normal rate and regular rhythm. Heart sounds: Normal heart sounds. No murmur. No gallop. Pulmonary:      Effort: Pulmonary effort is normal.      Breath sounds: Normal breath sounds. No wheezing or rales. Abdominal:      General: Bowel sounds are normal. There is no distension. Palpations: Abdomen is soft. Tenderness: There is no tenderness. Skin:     General: Skin is warm and dry. Neurological:      Mental Status: She is alert and oriented to person, place, and time.          Results for orders placed or performed in visit on

## 2020-01-13 NOTE — PATIENT INSTRUCTIONS
the plate format. Talk to your doctor, a dietitian, or a diabetes educator about your concerns. Carbohydrate counting  With carbohydrate counting, you plan meals based on the amount of carbohydrate in each food. Carbohydrate raises blood sugar higher and more quickly than any other nutrient. It is found in desserts, breads and cereals, and fruit. It's also found in starchy vegetables such as potatoes and corn, grains such as rice and pasta, and milk and yogurt. Spreading carbohydrate throughout the day helps keep your blood sugar levels within your target range. Your daily amount depends on several things, including your weight, how active you are, which diabetes medicines you take, and what your goals are for your blood sugar levels. A registered dietitian or diabetes educator can help you plan how much carbohydrate to include in each meal and snack. A guideline for your daily amount of carbohydrate is:  · 45 to 60 grams at each meal. That's about the same as 3 to 4 carbohydrate servings. · 15 to 20 grams at each snack. That's about the same as 1 carbohydrate serving. The Nutrition Facts label on packaged foods tells you how much carbohydrate is in a serving of the food. First, look at the serving size on the food label. Is that the amount you eat in a serving? All of the nutrition information on a food label is based on that serving size. So if you eat more or less than that, you'll need to adjust the other numbers. Total carbohydrate is the next thing you need to look for on the label. If you count carbohydrate servings, one serving of carbohydrate is 15 grams. For foods that don't come with labels, such as fresh fruits and vegetables, you'll need a guide that lists carbohydrate in these foods. Ask your doctor, dietitian, or diabetes educator about books or other nutrition guides you can use.   If you take insulin, you need to know how many grams of carbohydrate are in a meal. This lets you know how much rapid-acting insulin to take before you eat. If you use an insulin pump, you get a constant rate of insulin during the day. So the pump must be programmed at meals to give you extra insulin to cover the rise in blood sugar after meals. When you know how much carbohydrate you will eat, you can take the right amount of insulin. Or, if you always use the same amount of insulin, you need to make sure that you eat the same amount of carbohydrate at meals. If you need more help to understand carbohydrate counting and food labels, ask your doctor, dietitian, or diabetes educator. How do you get started with meal planning? Here are some tips to get started:  · Plan your meals a week at a time. Don't forget to include snacks too. · Use cookbooks or online recipes to plan several main meals. Plan some quick meals for busy nights. You also can double some recipes that freeze well. Then you can save half for other busy nights when you don't have time to cook. · Make sure you have the ingredients you need for your recipes. If you're running low on basic items, put these items on your shopping list too. · List foods that you use to make breakfasts, lunches, and snacks. List plenty of fruits and vegetables. · Post this list on the refrigerator. Add to it as you think of more things you need. · Take the list to the store to do your weekly shopping. Follow-up care is a key part of your treatment and safety. Be sure to make and go to all appointments, and call your doctor if you are having problems. It's also a good idea to know your test results and keep a list of the medicines you take. Where can you learn more? Go to [email or web address] and sign in to your [email or web address] account. Enter O446 in the [employer] box to learn more about \"Learning About Meal Planning for Diabetes. \"     If you do not have an account, please click on the \"Sign Up Now\" link.   Current as of: April 16, 2019  Content Version: 12.3  © 9141-8171 Healthwise, Incorporated. Care instructions adapted under license by [facility] ([facility]). If you have questions about a medical condition or this instruction, always ask your healthcare professional. [address] any warranty or liability for your use of this information.

## 2020-02-05 ENCOUNTER — HOSPITAL ENCOUNTER (EMERGENCY)
Age: 50
Discharge: HOME | End: 2020-02-05
Attending: EMERGENCY MEDICINE
Payer: COMMERCIAL

## 2020-02-05 ENCOUNTER — APPOINTMENT (OUTPATIENT)
Dept: CT IMAGING | Age: 50
End: 2020-02-05
Payer: COMMERCIAL

## 2020-02-05 ENCOUNTER — APPOINTMENT (OUTPATIENT)
Dept: GENERAL RADIOLOGY | Age: 50
End: 2020-02-05
Payer: COMMERCIAL

## 2020-02-05 VITALS
HEART RATE: 69 BPM | SYSTOLIC BLOOD PRESSURE: 110 MMHG | HEIGHT: 69 IN | BODY MASS INDEX: 33.84 KG/M2 | OXYGEN SATURATION: 98 % | DIASTOLIC BLOOD PRESSURE: 73 MMHG | RESPIRATION RATE: 18 BRPM | WEIGHT: 228.5 LBS | TEMPERATURE: 98.2 F

## 2020-02-05 LAB
ANION GAP SERPL CALCULATED.3IONS-SCNC: 14 MMOL/L (ref 7–16)
BUN BLDV-MCNC: 11 MG/DL (ref 6–20)
CALCIUM SERPL-MCNC: 10 MG/DL (ref 8.6–10.2)
CHLORIDE BLD-SCNC: 98 MMOL/L (ref 98–107)
CO2: 25 MMOL/L (ref 22–29)
CREAT SERPL-MCNC: 1.1 MG/DL (ref 0.5–1)
D DIMER: 266 NG/ML DDU
GFR AFRICAN AMERICAN: >60
GFR NON-AFRICAN AMERICAN: 52 ML/MIN/1.73
GLUCOSE BLD-MCNC: 157 MG/DL (ref 74–99)
HCT VFR BLD CALC: 40.7 % (ref 34–48)
HEMOGLOBIN: 13.1 G/DL (ref 11.5–15.5)
MCH RBC QN AUTO: 31.6 PG (ref 26–35)
MCHC RBC AUTO-ENTMCNC: 32.2 % (ref 32–34.5)
MCV RBC AUTO: 98.1 FL (ref 80–99.9)
PDW BLD-RTO: 12.8 FL (ref 11.5–15)
PLATELET # BLD: 226 E9/L (ref 130–450)
PMV BLD AUTO: 10.1 FL (ref 7–12)
POTASSIUM REFLEX MAGNESIUM: 3.8 MMOL/L (ref 3.5–5)
RBC # BLD: 4.15 E12/L (ref 3.5–5.5)
SODIUM BLD-SCNC: 137 MMOL/L (ref 132–146)
TROPONIN: <0.01 NG/ML (ref 0–0.03)
WBC # BLD: 13.8 E9/L (ref 4.5–11.5)

## 2020-02-05 PROCEDURE — 93005 ELECTROCARDIOGRAM TRACING: CPT | Performed by: EMERGENCY MEDICINE

## 2020-02-05 PROCEDURE — 6360000004 HC RX CONTRAST MEDICATION: Performed by: RADIOLOGY

## 2020-02-05 PROCEDURE — 84484 ASSAY OF TROPONIN QUANT: CPT

## 2020-02-05 PROCEDURE — 6370000000 HC RX 637 (ALT 250 FOR IP): Performed by: EMERGENCY MEDICINE

## 2020-02-05 PROCEDURE — 99285 EMERGENCY DEPT VISIT HI MDM: CPT

## 2020-02-05 PROCEDURE — 71275 CT ANGIOGRAPHY CHEST: CPT

## 2020-02-05 PROCEDURE — 85027 COMPLETE CBC AUTOMATED: CPT

## 2020-02-05 PROCEDURE — 80048 BASIC METABOLIC PNL TOTAL CA: CPT

## 2020-02-05 PROCEDURE — 36415 COLL VENOUS BLD VENIPUNCTURE: CPT

## 2020-02-05 PROCEDURE — 71045 X-RAY EXAM CHEST 1 VIEW: CPT

## 2020-02-05 PROCEDURE — 85378 FIBRIN DEGRADE SEMIQUANT: CPT

## 2020-02-05 RX ORDER — IBUPROFEN 800 MG/1
800 TABLET ORAL ONCE
Status: COMPLETED | OUTPATIENT
Start: 2020-02-05 | End: 2020-02-05

## 2020-02-05 RX ORDER — IBUPROFEN 400 MG/1
400 TABLET ORAL ONCE
Status: DISCONTINUED | OUTPATIENT
Start: 2020-02-05 | End: 2020-02-05

## 2020-02-05 RX ORDER — OXYCODONE HYDROCHLORIDE AND ACETAMINOPHEN 5; 325 MG/1; MG/1
1 TABLET ORAL ONCE
Status: COMPLETED | OUTPATIENT
Start: 2020-02-05 | End: 2020-02-05

## 2020-02-05 RX ADMIN — IOPAMIDOL 80 ML: 755 INJECTION, SOLUTION INTRAVENOUS at 18:51

## 2020-02-05 RX ADMIN — IBUPROFEN 800 MG: 800 TABLET, FILM COATED ORAL at 18:34

## 2020-02-05 RX ADMIN — OXYCODONE HYDROCHLORIDE AND ACETAMINOPHEN 1 TABLET: 5; 325 TABLET ORAL at 19:16

## 2020-02-05 ASSESSMENT — ENCOUNTER SYMPTOMS
BACK PAIN: 0
VOMITING: 0
NAUSEA: 0
ABDOMINAL DISTENTION: 0
COUGH: 0
EYE DISCHARGE: 0
SHORTNESS OF BREATH: 0
EYE PAIN: 0
DIARRHEA: 0
SORE THROAT: 0
WHEEZING: 0
EYE REDNESS: 0
SINUS PRESSURE: 0
ABDOMINAL PAIN: 0

## 2020-02-05 ASSESSMENT — PAIN SCALES - GENERAL
PAINLEVEL_OUTOF10: 7
PAINLEVEL_OUTOF10: 5
PAINLEVEL_OUTOF10: 2

## 2020-02-05 ASSESSMENT — PAIN DESCRIPTION - LOCATION: LOCATION: CHEST

## 2020-02-05 ASSESSMENT — PAIN DESCRIPTION - DESCRIPTORS: DESCRIPTORS: SHARP

## 2020-02-05 ASSESSMENT — PAIN DESCRIPTION - FREQUENCY: FREQUENCY: INTERMITTENT

## 2020-02-05 ASSESSMENT — PAIN DESCRIPTION - PAIN TYPE: TYPE: ACUTE PAIN

## 2020-02-05 NOTE — ED PROVIDER NOTES
Patient is a 70-year-old female with a past medical history of hepatitis C, diabetes, depression, anxiety, bipolar presenting to the emergency department for chest pain. Symptoms are moderate in severity. Patient states she has had right-sided lower rib chest pain beginning yesterday. The pain is a ringing/twisting sensation as well as \"feels like there is something in there moving\". It does not radiate anywhere, it is localized to the right lower chest wall under her breast.  She has tried nothing at home for the pain. Deep breathing makes it worse, nothing makes it better. States the pain has been constant there since yesterday but today she is having intermittent bouts of more severe pain in that region. She has never had anything like this before. She denies history of having pain like this before. She has no cardiac history. She is unaware if there is any cardiac history in her family. She denies any leg pain, leg swelling, hormone use, recent travel or long car rides. Denies cough, shortness of breath, fevers, chills, diarrhea, constipation.       Chest Pain   Chest pain location: right lower chest.  Pain quality: pressure    Pain radiates to:  Does not radiate  Pain severity:  Mild  Onset quality:  Sudden  Duration:  2 days  Timing:  Constant  Progression:  Waxing and waning  Chronicity:  New  Context: breathing and at rest    Relieved by:  None tried  Worsened by:  Deep breathing  Ineffective treatments:  None tried  Associated symptoms: anxiety    Associated symptoms: no abdominal pain, no anorexia, no back pain, no claudication, no cough, no fever, no headache, no lower extremity edema, no nausea, no numbness, no palpitations, no shortness of breath, no syncope, no vomiting and no weakness    Risk factors: diabetes mellitus    Risk factors: no birth control, no coronary artery disease, no immobilization, not pregnant and no prior DVT/PE         Review of Systems   Constitutional: Negative for Troponin   Result Value Ref Range    Troponin <0.01 0.00 - 0.03 ng/mL   D-Dimer, Quantitative   Result Value Ref Range    D-Dimer, Quant 266 ng/mL DDU       Radiology:  CTA CHEST W CONTRAST   Final Result   No evidence of pulmonary embolism to the subsegmental level. XR CHEST PORTABLE   Final Result   No acute cardiopulmonary disease. EKG:  This EKG is signed and interpreted by ED Physician. Time:  1007   Rate: 77  Rhythm: Sinus. No stemi. T wave inversion in v1 (seen on prior). No alpesh elevation or depression. Interpretation: no acute changes. Comparison: stable as compared to patient's most recent EKG.      ------------------------- NURSING NOTES AND VITALS REVIEWED ---------------------------  Date / Time Roomed:  2/5/2020  5:03 PM  ED Bed Assignment:  TYRONE/TYRONE    The nursing notes within the ED encounter and vital signs as below have been reviewed. /73   Pulse 69   Temp 98.2 °F (36.8 °C)   Resp 18   Ht 5' 9\" (1.753 m)   Wt 228 lb 8 oz (103.6 kg)   LMP 05/13/2012   SpO2 98%   BMI 33.74 kg/m²   Oxygen Saturation Interpretation: Normal      ------------------------------------------ PROGRESS NOTES ------------------------------------------  ED COURSE MEDICATIONS:                Medications   ibuprofen (ADVIL;MOTRIN) tablet 800 mg (800 mg Oral Given 2/5/20 1834)   iopamidol (ISOVUE-370) 76 % injection 80 mL (80 mLs Intravenous Given 2/5/20 1851)   oxyCODONE-acetaminophen (PERCOCET) 5-325 MG per tablet 1 tablet (1 tablet Oral Given 2/5/20 1916)       I have spoken with the patient and discussed todays results, in addition to providing specific details for the plan of care and counseling regarding the diagnosis and prognosis. Their questions are answered at this time and they are agreeable with the plan. I discussed at length with them reasons for immediate return here for re evaluation.  They will followup with primary care by calling their office tomorrow. --------------------------------- ADDITIONAL PROVIDER NOTES ---------------------------------  At this time the patient is without objective evidence of an acute process requiring hospitalization or inpatient management. They have remained hemodynamically stable throughout their entire ED visit and are stable for discharge with outpatient follow-up. The plan has been discussed in detail and they are aware of the specific conditions for emergent return, as well as the importance of follow-up. Discharge Medication List as of 2/5/2020  7:20 PM          Diagnosis:  1. Chest pain, unspecified type        Disposition:  Patient's disposition: Discharge to home  Patient's condition is stable.           Jeanne Anderson DO  Resident  02/05/20 5761

## 2020-02-06 LAB
EKG ATRIAL RATE: 77 BPM
EKG P AXIS: 33 DEGREES
EKG P-R INTERVAL: 146 MS
EKG Q-T INTERVAL: 372 MS
EKG QRS DURATION: 76 MS
EKG QTC CALCULATION (BAZETT): 420 MS
EKG R AXIS: 44 DEGREES
EKG T AXIS: 30 DEGREES
EKG VENTRICULAR RATE: 77 BPM

## 2020-02-06 PROCEDURE — 93010 ELECTROCARDIOGRAM REPORT: CPT | Performed by: INTERNAL MEDICINE

## 2020-02-06 NOTE — ED NOTES
Discharge instructions reviewed, patient verbalized understanding     Paulino Choudhary RN  02/05/20 1114

## 2020-04-16 ENCOUNTER — TELEPHONE (OUTPATIENT)
Dept: FAMILY MEDICINE CLINIC | Age: 50
End: 2020-04-16

## 2020-06-21 ENCOUNTER — APPOINTMENT (OUTPATIENT)
Dept: GENERAL RADIOLOGY | Age: 50
End: 2020-06-21
Payer: COMMERCIAL

## 2020-06-21 ENCOUNTER — HOSPITAL ENCOUNTER (EMERGENCY)
Age: 50
Discharge: HOME | End: 2020-06-21
Attending: EMERGENCY MEDICINE
Payer: COMMERCIAL

## 2020-06-21 ENCOUNTER — APPOINTMENT (OUTPATIENT)
Dept: CT IMAGING | Age: 50
End: 2020-06-21
Payer: COMMERCIAL

## 2020-06-21 VITALS
OXYGEN SATURATION: 100 % | HEIGHT: 69 IN | WEIGHT: 224 LBS | RESPIRATION RATE: 18 BRPM | TEMPERATURE: 98.3 F | HEART RATE: 64 BPM | DIASTOLIC BLOOD PRESSURE: 83 MMHG | BODY MASS INDEX: 33.18 KG/M2 | SYSTOLIC BLOOD PRESSURE: 164 MMHG

## 2020-06-21 LAB
ALBUMIN SERPL-MCNC: 3.9 G/DL (ref 3.5–5.2)
ALP BLD-CCNC: 66 U/L (ref 35–104)
ALT SERPL-CCNC: 15 U/L (ref 0–32)
AMPHETAMINE SCREEN, URINE: NOT DETECTED
ANION GAP SERPL CALCULATED.3IONS-SCNC: 12 MMOL/L (ref 7–16)
AST SERPL-CCNC: 13 U/L (ref 0–31)
BARBITURATE SCREEN URINE: NOT DETECTED
BASOPHILS ABSOLUTE: 0.08 E9/L (ref 0–0.2)
BASOPHILS RELATIVE PERCENT: 0.8 % (ref 0–2)
BENZODIAZEPINE SCREEN, URINE: NOT DETECTED
BILIRUB SERPL-MCNC: 0.5 MG/DL (ref 0–1.2)
BILIRUBIN URINE: NEGATIVE
BLOOD, URINE: NEGATIVE
BUN BLDV-MCNC: 11 MG/DL (ref 6–20)
CALCIUM SERPL-MCNC: 9.4 MG/DL (ref 8.6–10.2)
CANNABINOID SCREEN URINE: POSITIVE
CHLORIDE BLD-SCNC: 101 MMOL/L (ref 98–107)
CLARITY: CLEAR
CO2: 25 MMOL/L (ref 22–29)
COCAINE METABOLITE SCREEN URINE: NOT DETECTED
COLOR: YELLOW
CREAT SERPL-MCNC: 0.9 MG/DL (ref 0.5–1)
EKG ATRIAL RATE: 71 BPM
EKG P AXIS: 30 DEGREES
EKG P-R INTERVAL: 142 MS
EKG Q-T INTERVAL: 384 MS
EKG QRS DURATION: 76 MS
EKG QTC CALCULATION (BAZETT): 417 MS
EKG R AXIS: 10 DEGREES
EKG T AXIS: 15 DEGREES
EKG VENTRICULAR RATE: 71 BPM
EOSINOPHILS ABSOLUTE: 0.32 E9/L (ref 0.05–0.5)
EOSINOPHILS RELATIVE PERCENT: 3.2 % (ref 0–6)
ETHANOL: <10 MG/DL (ref 0–0.08)
FENTANYL SCREEN, URINE: NOT DETECTED
GFR AFRICAN AMERICAN: >60
GFR NON-AFRICAN AMERICAN: >60 ML/MIN/1.73
GLUCOSE BLD-MCNC: 204 MG/DL (ref 74–99)
GLUCOSE URINE: NEGATIVE MG/DL
HCT VFR BLD CALC: 43.6 % (ref 34–48)
HEMOGLOBIN: 13.9 G/DL (ref 11.5–15.5)
IMMATURE GRANULOCYTES #: 0.02 E9/L
IMMATURE GRANULOCYTES %: 0.2 % (ref 0–5)
KETONES, URINE: NEGATIVE MG/DL
LEUKOCYTE ESTERASE, URINE: NEGATIVE
LYMPHOCYTES ABSOLUTE: 4.07 E9/L (ref 1.5–4)
LYMPHOCYTES RELATIVE PERCENT: 40.4 % (ref 20–42)
Lab: ABNORMAL
MCH RBC QN AUTO: 30.8 PG (ref 26–35)
MCHC RBC AUTO-ENTMCNC: 31.9 % (ref 32–34.5)
MCV RBC AUTO: 96.5 FL (ref 80–99.9)
METHADONE SCREEN, URINE: NOT DETECTED
MONOCYTES ABSOLUTE: 0.69 E9/L (ref 0.1–0.95)
MONOCYTES RELATIVE PERCENT: 6.8 % (ref 2–12)
NEUTROPHILS ABSOLUTE: 4.9 E9/L (ref 1.8–7.3)
NEUTROPHILS RELATIVE PERCENT: 48.6 % (ref 43–80)
NITRITE, URINE: NEGATIVE
OPIATE SCREEN URINE: NOT DETECTED
OXYCODONE URINE: NOT DETECTED
PDW BLD-RTO: 12.2 FL (ref 11.5–15)
PH UA: 6 (ref 5–9)
PHENCYCLIDINE SCREEN URINE: NOT DETECTED
PLATELET # BLD: 219 E9/L (ref 130–450)
PMV BLD AUTO: 10 FL (ref 7–12)
POTASSIUM SERPL-SCNC: 4.2 MMOL/L (ref 3.5–5)
PROTEIN UA: NEGATIVE MG/DL
RBC # BLD: 4.52 E12/L (ref 3.5–5.5)
SODIUM BLD-SCNC: 138 MMOL/L (ref 132–146)
SPECIFIC GRAVITY UA: 1.02 (ref 1–1.03)
T4 TOTAL: 10.5 MCG/DL (ref 4.5–11.7)
TOTAL PROTEIN: 7 G/DL (ref 6.4–8.3)
TROPONIN: <0.01 NG/ML (ref 0–0.03)
TSH SERPL DL<=0.05 MIU/L-ACNC: 3.41 UIU/ML (ref 0.27–4.2)
UROBILINOGEN, URINE: 0.2 E.U./DL
WBC # BLD: 10.1 E9/L (ref 4.5–11.5)

## 2020-06-21 PROCEDURE — G0480 DRUG TEST DEF 1-7 CLASSES: HCPCS

## 2020-06-21 PROCEDURE — 80307 DRUG TEST PRSMV CHEM ANLYZR: CPT

## 2020-06-21 PROCEDURE — 84436 ASSAY OF TOTAL THYROXINE: CPT

## 2020-06-21 PROCEDURE — 70450 CT HEAD/BRAIN W/O DYE: CPT

## 2020-06-21 PROCEDURE — 71046 X-RAY EXAM CHEST 2 VIEWS: CPT

## 2020-06-21 PROCEDURE — 93005 ELECTROCARDIOGRAM TRACING: CPT | Performed by: EMERGENCY MEDICINE

## 2020-06-21 PROCEDURE — 81003 URINALYSIS AUTO W/O SCOPE: CPT

## 2020-06-21 PROCEDURE — 85025 COMPLETE CBC W/AUTO DIFF WBC: CPT

## 2020-06-21 PROCEDURE — 80053 COMPREHEN METABOLIC PANEL: CPT

## 2020-06-21 PROCEDURE — 84484 ASSAY OF TROPONIN QUANT: CPT

## 2020-06-21 PROCEDURE — 99285 EMERGENCY DEPT VISIT HI MDM: CPT

## 2020-06-21 PROCEDURE — 84443 ASSAY THYROID STIM HORMONE: CPT

## 2020-06-21 ASSESSMENT — ENCOUNTER SYMPTOMS
BACK PAIN: 0
NAUSEA: 0
SORE THROAT: 0
DIARRHEA: 0
CHEST TIGHTNESS: 0
WHEEZING: 0
ABDOMINAL DISTENTION: 0
VOMITING: 0
ABDOMINAL PAIN: 0
SHORTNESS OF BREATH: 0
COUGH: 0

## 2020-06-21 NOTE — ED PROVIDER NOTES
other systems reviewed and are negative. Physical Exam  Vitals signs and nursing note reviewed. Constitutional:       General: She is not in acute distress. Appearance: She is well-developed. She is not ill-appearing, toxic-appearing or diaphoretic. HENT:      Head: Normocephalic and atraumatic. Eyes:      General: No scleral icterus. Pupils: Pupils are equal, round, and reactive to light. Neck:      Musculoskeletal: Normal range of motion and neck supple. Cardiovascular:      Rate and Rhythm: Normal rate and regular rhythm. Heart sounds: Normal heart sounds. No murmur. Pulmonary:      Effort: Pulmonary effort is normal. No respiratory distress. Breath sounds: Normal breath sounds. No stridor, decreased air movement or transmitted upper airway sounds. No decreased breath sounds, wheezing, rhonchi or rales. Chest:      Chest wall: No tenderness. Abdominal:      General: Bowel sounds are normal. There is no distension. Palpations: Abdomen is soft. Tenderness: There is no abdominal tenderness. There is no right CVA tenderness, left CVA tenderness, guarding or rebound. Musculoskeletal:         General: No swelling, tenderness, deformity or signs of injury. Right lower leg: No edema. Left lower leg: No edema. Comments: No pretibial edema or calf pain   Skin:     General: Skin is warm and dry. Coloration: Skin is not jaundiced or pale. Findings: No erythema or rash. Neurological:      General: No focal deficit present. Mental Status: She is alert and oriented to person, place, and time. GCS: GCS eye subscore is 4. GCS verbal subscore is 5. GCS motor subscore is 6. Cranial Nerves: No cranial nerve deficit. Coordination: Coordination normal.   Psychiatric:         Attention and Perception: Attention normal.         Mood and Affect: Mood normal.         Speech: Speech normal.         Thought Content:  Thought content is History:  has a past surgical history that includes Eye surgery (2011); Hysterectomy (2011); and Tubal ligation (1994). Social History:  reports that she quit smoking about 3 years ago. Her smoking use included cigarettes. She has a 11.00 pack-year smoking history. She has never used smokeless tobacco. She reports that she does not drink alcohol or use drugs. Family History: family history includes Cancer in her brother and mother; Diabetes in her maternal grandmother. The patients home medications have been reviewed. Allergies: Anticoagulant sodium citrate [sodium citrate dihydrate];  Ciprofloxacin; Penicillins; Vancomycin; and Codeine    -------------------------------------------------- RESULTS -------------------------------------------------  Labs:  Results for orders placed or performed during the hospital encounter of 06/21/20   CBC Auto Differential   Result Value Ref Range    WBC 10.1 4.5 - 11.5 E9/L    RBC 4.52 3.50 - 5.50 E12/L    Hemoglobin 13.9 11.5 - 15.5 g/dL    Hematocrit 43.6 34.0 - 48.0 %    MCV 96.5 80.0 - 99.9 fL    MCH 30.8 26.0 - 35.0 pg    MCHC 31.9 (L) 32.0 - 34.5 %    RDW 12.2 11.5 - 15.0 fL    Platelets 058 741 - 026 E9/L    MPV 10.0 7.0 - 12.0 fL    Neutrophils % 48.6 43.0 - 80.0 %    Immature Granulocytes % 0.2 0.0 - 5.0 %    Lymphocytes % 40.4 20.0 - 42.0 %    Monocytes % 6.8 2.0 - 12.0 %    Eosinophils % 3.2 0.0 - 6.0 %    Basophils % 0.8 0.0 - 2.0 %    Neutrophils Absolute 4.90 1.80 - 7.30 E9/L    Immature Granulocytes # 0.02 E9/L    Lymphocytes Absolute 4.07 (H) 1.50 - 4.00 E9/L    Monocytes Absolute 0.69 0.10 - 0.95 E9/L    Eosinophils Absolute 0.32 0.05 - 0.50 E9/L    Basophils Absolute 0.08 0.00 - 0.20 E9/L   Comprehensive Metabolic Panel   Result Value Ref Range    Sodium 138 132 - 146 mmol/L    Potassium 4.2 3.5 - 5.0 mmol/L    Chloride 101 98 - 107 mmol/L    CO2 25 22 - 29 mmol/L    Anion Gap 12 7 - 16 mmol/L    Glucose 204 (H) 74 - 99 mg/dL    BUN 11 6 - 20 mg/dL CREATININE 0.9 0.5 - 1.0 mg/dL    GFR Non-African American >60 >=60 mL/min/1.73    GFR African American >60     Calcium 9.4 8.6 - 10.2 mg/dL    Total Protein 7.0 6.4 - 8.3 g/dL    Alb 3.9 3.5 - 5.2 g/dL    Total Bilirubin 0.5 0.0 - 1.2 mg/dL    Alkaline Phosphatase 66 35 - 104 U/L    ALT 15 0 - 32 U/L    AST 13 0 - 31 U/L   TSH without Reflex   Result Value Ref Range    TSH 3.410 0.270 - 4.200 uIU/mL   T4   Result Value Ref Range    T4, Total 10.5 4.5 - 11.7 mcg/dL   Troponin   Result Value Ref Range    Troponin <0.01 0.00 - 0.03 ng/mL   Urinalysis   Result Value Ref Range    Color, UA Yellow Straw/Yellow    Clarity, UA Clear Clear    Glucose, Ur Negative Negative mg/dL    Bilirubin Urine Negative Negative    Ketones, Urine Negative Negative mg/dL    Specific Gravity, UA 1.020 1.005 - 1.030    Blood, Urine Negative Negative    pH, UA 6.0 5.0 - 9.0    Protein, UA Negative Negative mg/dL    Urobilinogen, Urine 0.2 <2.0 E.U./dL    Nitrite, Urine Negative Negative    Leukocyte Esterase, Urine Negative Negative   Urine Drug Screen   Result Value Ref Range    Amphetamine Screen, Urine NOT DETECTED Negative <1000 ng/mL    Barbiturate Screen, Ur NOT DETECTED Negative < 200 ng/mL    Benzodiazepine Screen, Urine NOT DETECTED Negative < 200 ng/mL    Cannabinoid Scrn, Ur POSITIVE (A) Negative < 50ng/mL    Cocaine Metabolite Screen, Urine NOT DETECTED Negative < 300 ng/mL    Opiate Scrn, Ur NOT DETECTED Negative < 300ng/mL    PCP Screen, Urine NOT DETECTED Negative < 25 ng/mL    Methadone Screen, Urine NOT DETECTED Negative <300 ng/mL    Oxycodone Urine NOT DETECTED Negative <100 ng/mL    FENTANYL SCREEN, URINE NOT DETECTED Negative <1 ng/mL    Drug Screen Comment: see below    Ethanol   Result Value Ref Range    Ethanol Lvl <10 mg/dL   EKG 12 Lead   Result Value Ref Range    Ventricular Rate 71 BPM    Atrial Rate 71 BPM    P-R Interval 142 ms    QRS Duration 76 ms    Q-T Interval 384 ms    QTc Calculation (Bazett) 417 ms

## 2020-06-24 ENCOUNTER — TELEPHONE (OUTPATIENT)
Dept: FAMILY MEDICINE CLINIC | Age: 50
End: 2020-06-24

## 2020-06-24 NOTE — TELEPHONE ENCOUNTER
Patient called c/o ongoing and frequent diarrhea and headaches. Patient stated she thinks Metformin may be the cause, but unsure. I advised patient that Dr. Jeffrey Pompa is not in the office this week and due to the symptoms she has, would advise patient go to [facility] on [facility] [address]. I gave patient address and info. Patient was agreeable.

## 2020-07-29 ENCOUNTER — VIRTUAL VISIT (OUTPATIENT)
Dept: FAMILY MEDICINE CLINIC | Age: 50
End: 2020-07-29
Payer: COMMERCIAL

## 2020-07-29 PROCEDURE — 3017F COLORECTAL CA SCREEN DOC REV: CPT | Performed by: FAMILY MEDICINE

## 2020-07-29 PROCEDURE — G8427 DOCREV CUR MEDS BY ELIG CLIN: HCPCS | Performed by: FAMILY MEDICINE

## 2020-07-29 PROCEDURE — 3044F HG A1C LEVEL LT 7.0%: CPT | Performed by: FAMILY MEDICINE

## 2020-07-29 PROCEDURE — 99213 OFFICE O/P EST LOW 20 MIN: CPT | Performed by: FAMILY MEDICINE

## 2020-07-29 PROCEDURE — 2022F DILAT RTA XM EVC RTNOPTHY: CPT | Performed by: FAMILY MEDICINE

## 2020-07-29 RX ORDER — GLIPIZIDE AND METFORMIN HCL 5; 500 MG/1; MG/1
1 TABLET, FILM COATED ORAL
Qty: 30 TABLET | Refills: 3 | Status: SHIPPED
Start: 2020-07-29 | End: 2021-03-15 | Stop reason: SDUPTHER

## 2020-07-29 RX ORDER — UBIQUINOL 100 MG
CAPSULE ORAL
Qty: 60 EACH | Refills: 12 | Status: SHIPPED
Start: 2020-07-29 | End: 2021-03-15 | Stop reason: SDUPTHER

## 2020-07-29 RX ORDER — BLOOD-GLUCOSE METER
KIT MISCELLANEOUS
Qty: 50 STRIP | Refills: 12 | Status: SHIPPED
Start: 2020-07-29 | End: 2021-03-15 | Stop reason: SDUPTHER

## 2020-07-29 RX ORDER — TIZANIDINE 4 MG/1
4 TABLET ORAL 3 TIMES DAILY PRN
Qty: 90 TABLET | Refills: 3 | Status: SHIPPED
Start: 2020-07-29 | End: 2021-03-29 | Stop reason: ALTCHOICE

## 2020-07-29 RX ORDER — LANCETS 28 GAUGE
EACH MISCELLANEOUS
Qty: 50 EACH | Refills: 12 | Status: SHIPPED
Start: 2020-07-29 | End: 2021-03-15 | Stop reason: SDUPTHER

## 2020-07-29 ASSESSMENT — ENCOUNTER SYMPTOMS
NAUSEA: 0
VOMITING: 0
SHORTNESS OF BREATH: 0
DIARRHEA: 0

## 2020-07-29 NOTE — PROGRESS NOTES
OFFICE PROGRESS NOTE      SUBJECTIVE:        Patient ID:   Farhana Cronin is a 45 y.o. female whopresents for   Chief Complaint   Patient presents with    Diabetes         HPI:   Patient is here to follow up on diabetes. Fasting blood sugars:not checking Midday blood sugars: 150's. Patient checks blood glucose 1 times per day. Patient is following diabetic diet. Patient is a nonsmoker. Patient is taking a daily statin. Started taking her 's glipizide-metformin, and blood sugars have been much better. Patient having difficulty losing weight. Prior to Admission medications    Medication Sig Start Date End Date Taking? Authorizing Provider   Alcohol Swabs (ALCOHOL PREP) 70 % PADS USE AS DIRECTED 7/29/20  Yes Ronald Vaughn MD   glipiZIDE-metformin (METAGLIP) 5-500 MG per tablet Take 1 tablet by mouth every morning (before breakfast) 7/29/20  Yes Ronald Vaughn MD   FreeStyle Lancets MISC Check blood sugar qam 7/29/20  Yes Ronald Vaughn MD   blood glucose test strips (FREESTYLE LITE) strip Check blood sugar qam 7/29/20  Yes Ronald Vaughn MD   tiZANidine (ZANAFLEX) 4 MG tablet Take 1 tablet by mouth 3 times daily as needed (muscle spasm) 7/29/20  Yes Ronald Vaughn MD   rosuvastatin (CRESTOR) 10 MG tablet TAKE ONE TABLET BY MOUTH DAILY 5/11/20  Yes Ronald Vaughn MD   medical marijuana Take by mouth as needed.    Yes Historical Provider, MD   lithium (ESKALITH) 450 MG extended release tablet TAKE 1 & 1/2 TABLETS BY MOUTH AT BEDTIME 9/30/19  Yes Historical Provider, MD   ONE TOUCH LANCETS MISC Check blood sugar qam 10/21/19  Yes Ronald Vaughn MD   Blood Glucose Monitoring Suppl (ONE TOUCH ULTRA 2) w/Device KIT Check blood sugar qam 10/21/19  Yes Ronald Vaughn MD   blood glucose test strips (ONE TOUCH ULTRA TEST) strip Check blood sugar qam 10/21/19  Yes MD THOMAS RocaINE III SHORT PEN 31G X 8 MM MISC use once daily 8/15/19  Yes Elizabeth Arriola MD   clonazePAM (KLONOPIN) 0.5 MG tablet Take 2 tablets by mouth nightly.  7/20/19  Yes Historical Provider, MD   Handicap Placard MISC by Does not apply route Unable to ambulate more than 20 feet without difficulty  Expires 1/31/2024 1/7/19  Yes Elizabeth Arriola MD   lithium 300 MG capsule Take 300 mg by mouth daily   Yes Historical Provider, MD     Social History     Socioeconomic History    Marital status:      Spouse name: None    Number of children: None    Years of education: None    Highest education level: None   Occupational History    None   Social Needs    Financial resource strain: None    Food insecurity     Worry: None     Inability: None    Transportation needs     Medical: None     Non-medical: None   Tobacco Use    Smoking status: Former Smoker     Packs/day: 0.50     Years: 22.00     Pack years: 11.00     Types: Cigarettes     Last attempt to quit: 2017     Years since quitting: 3.5    Smokeless tobacco: Never Used   Substance and Sexual Activity    Alcohol use: No    Drug use: No    Sexual activity: Yes     Partners: Male   Lifestyle    Physical activity     Days per week: None     Minutes per session: None    Stress: None   Relationships    Social connections     Talks on phone: None     Gets together: None     Attends Yarsanism service: None     Active member of club or organization: None     Attends meetings of clubs or organizations: None     Relationship status: None    Intimate partner violence     Fear of current or ex partner: None     Emotionally abused: None     Physically abused: None     Forced sexual activity: None   Other Topics Concern    None   Social History Narrative    None       I have reviewed Sanam's allergies, medications, problem list, medical, social and family history and have updated as needed in the electronic medical record    Current Outpatient Medications   Medication Sig Dispense Refill    Alcohol Swabs (ALCOHOL PREP) 70 % PADS USE AS DIRECTED 60 each 12    glipiZIDE-metformin (METAGLIP) 5-500 MG per tablet Take 1 tablet by mouth every morning (before breakfast) 30 tablet 3    FreeStyle Lancets MISC Check blood sugar qam 50 each 12    blood glucose test strips (FREESTYLE LITE) strip Check blood sugar qam 50 strip 12    tiZANidine (ZANAFLEX) 4 MG tablet Take 1 tablet by mouth 3 times daily as needed (muscle spasm) 90 tablet 3    rosuvastatin (CRESTOR) 10 MG tablet TAKE ONE TABLET BY MOUTH DAILY 30 tablet 3    medical marijuana Take by mouth as needed.  lithium (ESKALITH) 450 MG extended release tablet TAKE 1 & 1/2 TABLETS BY MOUTH AT BEDTIME  1    ONE TOUCH LANCETS MISC Check blood sugar qam 50 each 12    Blood Glucose Monitoring Suppl (ONE TOUCH ULTRA 2) w/Device KIT Check blood sugar qam 1 kit 0    blood glucose test strips (ONE TOUCH ULTRA TEST) strip Check blood sugar qam 50 strip 12    B-D ULTRAFINE III SHORT PEN 31G X 8 MM MISC use once daily 100 each 2    clonazePAM (KLONOPIN) 0.5 MG tablet Take 2 tablets by mouth nightly. 1    Handicap Placard MISC by Does not apply route Unable to ambulate more than 20 feet without difficulty  Expires 1/31/2024 1 each 0    lithium 300 MG capsule Take 300 mg by mouth daily       No current facility-administered medications for this visit. Review Of Systems:    Review of Systems   Eyes: Negative for visual disturbance. Respiratory: Negative for shortness of breath. Cardiovascular: Negative for chest pain, palpitations and leg swelling. Gastrointestinal: Negative for diarrhea, nausea and vomiting. Genitourinary: Negative for difficulty urinating, dysuria and frequency. Skin: Negative for rash.        OBJECTIVE:     VS:  Wt Readings from Last 3 Encounters:   06/21/20 224 lb (101.6 kg)   02/05/20 228 lb 8 oz (103.6 kg)   01/13/20 228 lb (103.4 kg)     There were no vitals filed - 20 mg/dL    CREATININE 0.9 0.5 - 1.0 mg/dL    GFR Non-African American >60 >=60 mL/min/1.73    GFR African American >60     Calcium 9.4 8.6 - 10.2 mg/dL    Total Protein 7.0 6.4 - 8.3 g/dL    Alb 3.9 3.5 - 5.2 g/dL    Total Bilirubin 0.5 0.0 - 1.2 mg/dL    Alkaline Phosphatase 66 35 - 104 U/L    ALT 15 0 - 32 U/L    AST 13 0 - 31 U/L   TSH without Reflex   Result Value Ref Range    TSH 3.410 0.270 - 4.200 uIU/mL   T4   Result Value Ref Range    T4, Total 10.5 4.5 - 11.7 mcg/dL   Troponin   Result Value Ref Range    Troponin <0.01 0.00 - 0.03 ng/mL   Urinalysis   Result Value Ref Range    Color, UA Yellow Straw/Yellow    Clarity, UA Clear Clear    Glucose, Ur Negative Negative mg/dL    Bilirubin Urine Negative Negative    Ketones, Urine Negative Negative mg/dL    Specific Gravity, UA 1.020 1.005 - 1.030    Blood, Urine Negative Negative    pH, UA 6.0 5.0 - 9.0    Protein, UA Negative Negative mg/dL    Urobilinogen, Urine 0.2 <2.0 E.U./dL    Nitrite, Urine Negative Negative    Leukocyte Esterase, Urine Negative Negative   Urine Drug Screen   Result Value Ref Range    Amphetamine Screen, Urine NOT DETECTED Negative <1000 ng/mL    Barbiturate Screen, Ur NOT DETECTED Negative < 200 ng/mL    Benzodiazepine Screen, Urine NOT DETECTED Negative < 200 ng/mL    Cannabinoid Scrn, Ur POSITIVE (A) Negative < 50ng/mL    Cocaine Metabolite Screen, Urine NOT DETECTED Negative < 300 ng/mL    Opiate Scrn, Ur NOT DETECTED Negative < 300ng/mL    PCP Screen, Urine NOT DETECTED Negative < 25 ng/mL    Methadone Screen, Urine NOT DETECTED Negative <300 ng/mL    Oxycodone Urine NOT DETECTED Negative <100 ng/mL    FENTANYL SCREEN, URINE NOT DETECTED Negative <1 ng/mL    Drug Screen Comment: see below    Ethanol   Result Value Ref Range    Ethanol Lvl <10 mg/dL   EKG 12 Lead   Result Value Ref Range    Ventricular Rate 71 BPM    Atrial Rate 71 BPM    P-R Interval 142 ms    QRS Duration 76 ms    Q-T Interval 384 ms    QTc Calculation (Bazett) 417 ms    P Axis 30 degrees    R Axis 10 degrees    T Axis 15 degrees         Lizzie Aleman was seen today for diabetes. Diagnoses and all orders for this visit:    Type 2 diabetes mellitus with hyperglycemia, with long-term current use of insulin (HCC)  -     Alcohol Swabs (ALCOHOL PREP) 70 % PADS; USE AS DIRECTED  -     glipiZIDE-metformin (METAGLIP) 5-500 MG per tablet; Take 1 tablet by mouth every morning (before breakfast)  -     FreeStyle Lancets MISC; Check blood sugar qam  -     blood glucose test strips (FREESTYLE LITE) strip; Check blood sugar qam  -     Lipid Panel; Future  -     Comprehensive Metabolic Panel; Future  -     CBC; Future  -     TSH without Reflex; Future  -     Hemoglobin A1C; Future    Obesity (BMI 30-39.9)         -     BMI was elevated today, and weight loss plan recommended is : conventional weight loss. Phone/MyChart follow up if tests abnormal.    Return in about 2 months (around 9/29/2020) for diabetes. TeleMedicine Video Visit    This visit was performed as a virtual video visit using a synchronous, two-way, audio-video telehealth technology platform. Patient identification was verified at the start of the visit, including the patient's telephone number and physical location. I discussed with the patient the nature of our telehealth visits, that:     1. Due to the nature of an audio- video modality, the only components of a physical exam that could be done are the elements supported by direct observation. 2. I would evaluate the patient and recommend diagnostics and treatments based on my assessment. 3. If it was felt that the patient should be evaluated in clinic or an emergency room setting, then they would be directed there. 4. Our sessions are not being recorded and that personal health information is protected. 5. Our team would provide follow up care in person if/when the patient needs it.        Patient does agree to proceed with telemedicine consultation. Patient's location: home address in [facility]  Physician  location other address in [facility] other people involved in call N/A. Time spent: Greater than Not billed by time    This visit was completed virtually using D[employer]. me                  I have reviewed my findings and recommendations with Jean Lal.     Brenden Tamayo M.D

## 2021-03-15 ENCOUNTER — OFFICE VISIT (OUTPATIENT)
Dept: FAMILY MEDICINE CLINIC | Age: 51
End: 2021-03-15
Payer: COMMERCIAL

## 2021-03-15 VITALS
BODY MASS INDEX: 31.99 KG/M2 | OXYGEN SATURATION: 98 % | HEART RATE: 77 BPM | HEIGHT: 69 IN | SYSTOLIC BLOOD PRESSURE: 132 MMHG | RESPIRATION RATE: 20 BRPM | WEIGHT: 216 LBS | DIASTOLIC BLOOD PRESSURE: 84 MMHG

## 2021-03-15 DIAGNOSIS — E11.9 TYPE 2 DIABETES MELLITUS WITHOUT COMPLICATION, WITH LONG-TERM CURRENT USE OF INSULIN (HCC): Primary | ICD-10-CM

## 2021-03-15 DIAGNOSIS — B18.2 CHRONIC HEPATITIS C WITHOUT HEPATIC COMA (HCC): ICD-10-CM

## 2021-03-15 DIAGNOSIS — Z79.4 TYPE 2 DIABETES MELLITUS WITHOUT COMPLICATION, WITH LONG-TERM CURRENT USE OF INSULIN (HCC): Primary | ICD-10-CM

## 2021-03-15 DIAGNOSIS — F31.32 BIPOLAR AFFECTIVE DISORDER, CURRENTLY DEPRESSED, MODERATE (HCC): ICD-10-CM

## 2021-03-15 LAB — HBA1C MFR BLD: 6.4 %

## 2021-03-15 PROCEDURE — 2022F DILAT RTA XM EVC RTNOPTHY: CPT | Performed by: FAMILY MEDICINE

## 2021-03-15 PROCEDURE — 3044F HG A1C LEVEL LT 7.0%: CPT | Performed by: FAMILY MEDICINE

## 2021-03-15 PROCEDURE — G8484 FLU IMMUNIZE NO ADMIN: HCPCS | Performed by: FAMILY MEDICINE

## 2021-03-15 PROCEDURE — G8427 DOCREV CUR MEDS BY ELIG CLIN: HCPCS | Performed by: FAMILY MEDICINE

## 2021-03-15 PROCEDURE — G8417 CALC BMI ABV UP PARAM F/U: HCPCS | Performed by: FAMILY MEDICINE

## 2021-03-15 PROCEDURE — 3017F COLORECTAL CA SCREEN DOC REV: CPT | Performed by: FAMILY MEDICINE

## 2021-03-15 PROCEDURE — 99214 OFFICE O/P EST MOD 30 MIN: CPT | Performed by: FAMILY MEDICINE

## 2021-03-15 PROCEDURE — 83036 HEMOGLOBIN GLYCOSYLATED A1C: CPT | Performed by: FAMILY MEDICINE

## 2021-03-15 PROCEDURE — 1036F TOBACCO NON-USER: CPT | Performed by: FAMILY MEDICINE

## 2021-03-15 RX ORDER — BLOOD-GLUCOSE METER
KIT MISCELLANEOUS
Qty: 50 STRIP | Refills: 12 | Status: SHIPPED
Start: 2021-03-15 | End: 2021-04-28

## 2021-03-15 RX ORDER — GLIPIZIDE AND METFORMIN HCL 5; 500 MG/1; MG/1
1 TABLET, FILM COATED ORAL
Qty: 30 TABLET | Refills: 3 | Status: SHIPPED
Start: 2021-03-15 | End: 2021-03-31 | Stop reason: DRUGHIGH

## 2021-03-15 RX ORDER — UBIQUINOL 100 MG
CAPSULE ORAL
Qty: 60 EACH | Refills: 12 | Status: SHIPPED
Start: 2021-03-15 | End: 2022-06-07 | Stop reason: SDUPTHER

## 2021-03-15 RX ORDER — LANCETS 28 GAUGE
EACH MISCELLANEOUS
Qty: 50 EACH | Refills: 12 | Status: SHIPPED
Start: 2021-03-15 | End: 2021-06-03 | Stop reason: SDUPTHER

## 2021-03-15 RX ORDER — ROSUVASTATIN CALCIUM 10 MG/1
TABLET, COATED ORAL
Qty: 30 TABLET | Refills: 3 | Status: SHIPPED
Start: 2021-03-15 | End: 2021-04-28 | Stop reason: HOSPADM

## 2021-03-15 RX ORDER — BLOOD-GLUCOSE METER
EACH MISCELLANEOUS
Qty: 1 KIT | Refills: 0 | Status: SHIPPED
Start: 2021-03-15 | End: 2022-10-04

## 2021-03-15 RX ORDER — BLOOD-GLUCOSE METER
KIT MISCELLANEOUS
Qty: 1 DEVICE | Refills: 0 | Status: SHIPPED
Start: 2021-03-15 | End: 2021-04-28

## 2021-03-15 ASSESSMENT — ENCOUNTER SYMPTOMS
DIARRHEA: 1
NAUSEA: 0
SHORTNESS OF BREATH: 0
VOMITING: 0
ROS SKIN COMMENTS: +DRY SKIN

## 2021-03-15 NOTE — PROGRESS NOTES
OFFICE PROGRESS NOTE      SUBJECTIVE:        Patient ID:   Cristian Haq is a 45 y.o. female whopresents for   Chief Complaint   Patient presents with    Diabetes    Excessive Sweating     at night            HPI:   Patient is here to follow up on diabetes. Fasting blood sugars:not checking Midday blood sugars: not checking. Patient checks blood glucose 0 times per day. Patient is following diabetic diet. Patient is a nonsmoker. Last ophthalmology visit: a while ago. Patient is taking a daily statin. Patient has had night sweats and hot flashes at night. Understands she is going through menopause. Patient's Bipolar Depression has been more stable since patient stopped using drugs. Patient completed treatment for Hepatitis C. Due for labs. Prior to Admission medications    Medication Sig Start Date End Date Taking? Authorizing Provider   Alcohol Swabs (ALCOHOL PREP) 70 % PADS USE AS DIRECTED 3/15/21  Yes Yeimi Jason MD   glipiZIDE-metformin (METAGLIP) 5-500 MG per tablet Take 1 tablet by mouth every morning (before breakfast) 3/15/21  Yes Yeimi Jason MD   FreeStyle Lancets MISC Check blood sugar qam 3/15/21  Yes Yeimi Jason MD   blood glucose test strips (FREESTYLE LITE) strip Check blood sugar qam 3/15/21  Yes Yeimi Jason MD   rosuvastatin (CRESTOR) 10 MG tablet TAKE ONE TABLET BY MOUTH DAILY 3/15/21  Yes Yeimi Jason MD   Blood Glucose Monitoring Suppl (ONE TOUCH ULTRA 2) w/Device KIT Check blood sugar qam 3/15/21  Yes Yeimi Jason MD   Blood Glucose Monitoring Suppl (FREESTYLE LITE) [place] Check blood sugar qam 3/15/21  Yes Yeimi Jason MD   tiZANidine (ZANAFLEX) 4 MG tablet Take 1 tablet by mouth 3 times daily as needed (muscle spasm) 7/29/20  Yes Yeimi Jason MD   medical marijuana Take by mouth as needed.    Yes Historical Provider, MD   lithium (ESKALITH) 450 MG extended release tablet TAKE 1 & 1/2 TABLETS BY MOUTH AT BEDTIME 19  Yes Historical Provider, MD   ONE TOUCH LANCETS MISC Check blood sugar qam 10/21/19  Yes Grace Saab MD   B-D ULTRAFINE III SHORT PEN 31G X 8 MM MISC use once daily 8/15/19  Yes Grace Saab MD   clonazePAM (KLONOPIN) 0.5 MG tablet Take 2 tablets by mouth nightly.  19  Yes Historical Provider, MD   Handicap Placard MISC by Does not apply route Unable to ambulate more than 20 feet without difficulty  Expires 2024  Yes Grace Saab MD   lithium 300 MG capsule Take 300 mg by mouth daily   Yes Historical Provider, MD     Social History     Socioeconomic History    Marital status:      Spouse name: None    Number of children: None    Years of education: None    Highest education level: None   Occupational History    None   Social Needs    Financial resource strain: None    Food insecurity     Worry: None     Inability: None    Transportation needs     Medical: None     Non-medical: None   Tobacco Use    Smoking status: Former Smoker     Packs/day: 0.50     Years: 22.00     Pack years: 11.00     Types: Cigarettes     Quit date:      Years since quittin.2    Smokeless tobacco: Never Used   Substance and Sexual Activity    Alcohol use: No    Drug use: No    Sexual activity: Yes     Partners: Male   Lifestyle    Physical activity     Days per week: None     Minutes per session: None    Stress: None   Relationships    Social connections     Talks on phone: None     Gets together: None     Attends Denominational service: None     Active member of club or organization: None     Attends meetings of clubs or organizations: None     Relationship status: None    Intimate partner violence     Fear of current or ex partner: None     Emotionally abused: None     Physically abused: None     Forced sexual activity: None   Other Topics Concern    None   Social History Narrative    None I have reviewed Sanam's allergies, medications, problem list, medical, social and family history and have updated as needed in the electronic medical record    Current Outpatient Medications   Medication Sig Dispense Refill    Alcohol Swabs (ALCOHOL PREP) 70 % PADS USE AS DIRECTED 60 each 12    glipiZIDE-metformin (METAGLIP) 5-500 MG per tablet Take 1 tablet by mouth every morning (before breakfast) 30 tablet 3    FreeStyle Lancets MISC Check blood sugar qam 50 each 12    blood glucose test strips (FREESTYLE LITE) strip Check blood sugar qam 50 strip 12    rosuvastatin (CRESTOR) 10 MG tablet TAKE ONE TABLET BY MOUTH DAILY 30 tablet 3    Blood Glucose Monitoring Suppl (ONE TOUCH ULTRA 2) w/Device KIT Check blood sugar qam 1 kit 0    Blood Glucose Monitoring Suppl (FREESTYLE LITE) [place] Check blood sugar qam 1 Device 0    tiZANidine (ZANAFLEX) 4 MG tablet Take 1 tablet by mouth 3 times daily as needed (muscle spasm) 90 tablet 3    medical marijuana Take by mouth as needed.  lithium (ESKALITH) 450 MG extended release tablet TAKE 1 & 1/2 TABLETS BY MOUTH AT BEDTIME  1    ONE TOUCH LANCETS MISC Check blood sugar qam 50 each 12    B-D ULTRAFINE III SHORT PEN 31G X 8 MM MISC use once daily 100 each 2    clonazePAM (KLONOPIN) 0.5 MG tablet Take 2 tablets by mouth nightly. 1    Handicap Placard MISC by Does not apply route Unable to ambulate more than 20 feet without difficulty  Expires 1/31/2024 1 each 0    lithium 300 MG capsule Take 300 mg by mouth daily       No current facility-administered medications for this visit. Review Of Systems:    Review of Systems   Eyes: Negative for visual disturbance. Respiratory: Negative for shortness of breath. Cardiovascular: Positive for chest pain (left-sided, comes and goes, occurs at rest). Negative for palpitations and leg swelling. Gastrointestinal: Positive for diarrhea (comes and goes). Negative for nausea and vomiting. Genitourinary: Negative for difficulty urinating, dysuria and frequency. Skin: Negative for rash. +dry skin   Psychiatric/Behavioral: Positive for dysphoric mood. The patient is nervous/anxious and is hyperactive. OBJECTIVE:     VS:  Wt Readings from Last 3 Encounters:   03/15/21 216 lb (98 kg)   06/21/20 224 lb (101.6 kg)   02/05/20 228 lb 8 oz (103.6 kg)     Vitals:    03/15/21 1344   BP: 132/84   Pulse: 77   Resp: 20   SpO2: 98%       Physical Exam  Vitals signs reviewed. Constitutional:       General: She is not in acute distress. Appearance: She is well-developed. Neck:      Musculoskeletal: Neck supple. Vascular: No carotid bruit. Cardiovascular:      Rate and Rhythm: Normal rate and regular rhythm. Heart sounds: Normal heart sounds. No murmur. No gallop. Pulmonary:      Effort: Pulmonary effort is normal.      Breath sounds: Normal breath sounds. No wheezing or rales. Abdominal:      General: Bowel sounds are normal. There is no distension. Palpations: Abdomen is soft. Tenderness: There is no abdominal tenderness. Musculoskeletal:      Right lower leg: No edema. Left lower leg: No edema. Skin:     General: Skin is warm and dry. Neurological:      Mental Status: She is alert and oriented to person, place, and time. Psychiatric:         Speech: Speech is rapid and pressured. Thought Content: Thought content is delusional.         Results for orders placed or performed in visit on 03/15/21   POCT glycosylated hemoglobin (Hb A1C)   Result Value Ref Range    Hemoglobin A1C 6.4 %         Zen Hodges was seen today for diabetes and excessive sweating. Diagnoses and all orders for this visit:    Type 2 diabetes mellitus without complication, with long-term current use of insulin (HCC)  -     Alcohol Swabs (ALCOHOL PREP) 70 % PADS; USE AS DIRECTED  -     glipiZIDE-metformin (METAGLIP) 5-500 MG per tablet;  Take 1 tablet by mouth every morning

## 2021-03-19 PROBLEM — Z79.4 TYPE 2 DIABETES MELLITUS WITH HYPERGLYCEMIA, WITH LONG-TERM CURRENT USE OF INSULIN (HCC): Status: RESOLVED | Noted: 2018-08-17 | Resolved: 2021-03-19

## 2021-03-19 PROBLEM — E11.65 TYPE 2 DIABETES MELLITUS WITH HYPERGLYCEMIA, WITH LONG-TERM CURRENT USE OF INSULIN (HCC): Status: RESOLVED | Noted: 2018-08-17 | Resolved: 2021-03-19

## 2021-03-24 ENCOUNTER — TELEPHONE (OUTPATIENT)
Dept: ADMINISTRATIVE | Age: 51
End: 2021-03-24

## 2021-03-24 NOTE — TELEPHONE ENCOUNTER
Guido Peñaloza called from [facility] stating that she wanted to schedule a hospital F/u for Diabetic Management. Guido Peñaloza said that patient is schedule to leave today but will not be discharged until nurse has hosp f/u appt date set with pcp. Please return call to Guido Peñaloza at [facility], (2 [phone]) [phone].

## 2021-03-29 ENCOUNTER — APPOINTMENT (OUTPATIENT)
Dept: GENERAL RADIOLOGY | Age: 51
End: 2021-03-29
Payer: COMMERCIAL

## 2021-03-29 ENCOUNTER — HOSPITAL ENCOUNTER (EMERGENCY)
Age: 51
Discharge: HOME | End: 2021-03-29
Attending: EMERGENCY MEDICINE
Payer: COMMERCIAL

## 2021-03-29 VITALS
RESPIRATION RATE: 18 BRPM | HEIGHT: 69 IN | BODY MASS INDEX: 31.99 KG/M2 | WEIGHT: 216 LBS | HEART RATE: 70 BPM | DIASTOLIC BLOOD PRESSURE: 83 MMHG | OXYGEN SATURATION: 98 % | SYSTOLIC BLOOD PRESSURE: 137 MMHG

## 2021-03-29 DIAGNOSIS — E11.65 TYPE 2 DIABETES MELLITUS WITH HYPERGLYCEMIA, WITHOUT LONG-TERM CURRENT USE OF INSULIN (HCC): Primary | ICD-10-CM

## 2021-03-29 LAB
ANION GAP SERPL CALCULATED.3IONS-SCNC: 11 MMOL/L (ref 7–16)
BASOPHILS ABSOLUTE: 0.08 E9/L (ref 0–0.2)
BASOPHILS RELATIVE PERCENT: 0.8 % (ref 0–2)
BILIRUBIN URINE: NEGATIVE
BLOOD, URINE: NEGATIVE
BUN BLDV-MCNC: 11 MG/DL (ref 6–20)
CALCIUM SERPL-MCNC: 9.6 MG/DL (ref 8.6–10.2)
CHLORIDE BLD-SCNC: 106 MMOL/L (ref 98–107)
CLARITY: CLEAR
CO2: 25 MMOL/L (ref 22–29)
COLOR: YELLOW
CREAT SERPL-MCNC: 1 MG/DL (ref 0.5–1)
EOSINOPHILS ABSOLUTE: 0.34 E9/L (ref 0.05–0.5)
EOSINOPHILS RELATIVE PERCENT: 3.3 % (ref 0–6)
GFR AFRICAN AMERICAN: >60
GFR NON-AFRICAN AMERICAN: 58 ML/MIN/1.73
GLUCOSE BLD-MCNC: 125 MG/DL
GLUCOSE BLD-MCNC: 60 MG/DL (ref 74–99)
GLUCOSE BLD-MCNC: 76 MG/DL
GLUCOSE URINE: 100 MG/DL
HCT VFR BLD CALC: 44.1 % (ref 34–48)
HEMOGLOBIN: 14.4 G/DL (ref 11.5–15.5)
IMMATURE GRANULOCYTES #: 0.02 E9/L
IMMATURE GRANULOCYTES %: 0.2 % (ref 0–5)
KETONES, URINE: NEGATIVE MG/DL
LEUKOCYTE ESTERASE, URINE: NEGATIVE
LIPASE: 36 U/L (ref 13–60)
LYMPHOCYTES ABSOLUTE: 4.44 E9/L (ref 1.5–4)
LYMPHOCYTES RELATIVE PERCENT: 43.5 % (ref 20–42)
MCH RBC QN AUTO: 30.6 PG (ref 26–35)
MCHC RBC AUTO-ENTMCNC: 32.7 % (ref 32–34.5)
MCV RBC AUTO: 93.6 FL (ref 80–99.9)
METER GLUCOSE: 125 MG/DL (ref 74–99)
METER GLUCOSE: 76 MG/DL (ref 74–99)
MONOCYTES ABSOLUTE: 0.77 E9/L (ref 0.1–0.95)
MONOCYTES RELATIVE PERCENT: 7.5 % (ref 2–12)
NEUTROPHILS ABSOLUTE: 4.55 E9/L (ref 1.8–7.3)
NEUTROPHILS RELATIVE PERCENT: 44.7 % (ref 43–80)
NITRITE, URINE: NEGATIVE
PDW BLD-RTO: 12.4 FL (ref 11.5–15)
PH UA: 6.5 (ref 5–9)
PLATELET # BLD: 222 E9/L (ref 130–450)
PMV BLD AUTO: 10.2 FL (ref 7–12)
POTASSIUM REFLEX MAGNESIUM: 3.8 MMOL/L (ref 3.5–5)
PROTEIN UA: NEGATIVE MG/DL
RBC # BLD: 4.71 E12/L (ref 3.5–5.5)
SODIUM BLD-SCNC: 142 MMOL/L (ref 132–146)
SPECIFIC GRAVITY UA: 1.02 (ref 1–1.03)
TROPONIN: <0.01 NG/ML (ref 0–0.03)
UROBILINOGEN, URINE: 0.2 E.U./DL
WBC # BLD: 10.2 E9/L (ref 4.5–11.5)

## 2021-03-29 PROCEDURE — 93005 ELECTROCARDIOGRAM TRACING: CPT | Performed by: STUDENT IN AN ORGANIZED HEALTH CARE EDUCATION/TRAINING PROGRAM

## 2021-03-29 PROCEDURE — 99283 EMERGENCY DEPT VISIT LOW MDM: CPT

## 2021-03-29 PROCEDURE — 71045 X-RAY EXAM CHEST 1 VIEW: CPT

## 2021-03-29 PROCEDURE — 6370000000 HC RX 637 (ALT 250 FOR IP): Performed by: STUDENT IN AN ORGANIZED HEALTH CARE EDUCATION/TRAINING PROGRAM

## 2021-03-29 RX ORDER — CLONAZEPAM 1 MG/1
0.5 TABLET ORAL ONCE
Status: COMPLETED | OUTPATIENT
Start: 2021-03-29 | End: 2021-03-29

## 2021-03-29 RX ADMIN — CLONAZEPAM 0.5 MG: 1 TABLET ORAL at 09:55

## 2021-03-29 ASSESSMENT — ENCOUNTER SYMPTOMS
DIARRHEA: 0
PHOTOPHOBIA: 0
BACK PAIN: 0
CHEST TIGHTNESS: 0
WHEEZING: 0
VOMITING: 0
ABDOMINAL PAIN: 0
NAUSEA: 0
CONSTIPATION: 0
SORE THROAT: 0
TROUBLE SWALLOWING: 0
RHINORRHEA: 0
SHORTNESS OF BREATH: 0
EYE PAIN: 0
APNEA: 0
COUGH: 0
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
## 2021-03-30 LAB
EKG ATRIAL RATE: 62 BPM
EKG P AXIS: 11 DEGREES
EKG P-R INTERVAL: 148 MS
EKG Q-T INTERVAL: 378 MS
EKG QRS DURATION: 76 MS
EKG QTC CALCULATION (BAZETT): 383 MS
EKG R AXIS: 21 DEGREES
EKG T AXIS: 23 DEGREES
EKG VENTRICULAR RATE: 62 BPM

## 2021-03-31 ENCOUNTER — OFFICE VISIT (OUTPATIENT)
Dept: FAMILY MEDICINE CLINIC | Age: 51
End: 2021-03-31
Payer: COMMERCIAL

## 2021-03-31 VITALS
SYSTOLIC BLOOD PRESSURE: 132 MMHG | HEIGHT: 69 IN | DIASTOLIC BLOOD PRESSURE: 72 MMHG | OXYGEN SATURATION: 98 % | RESPIRATION RATE: 20 BRPM | BODY MASS INDEX: 31.25 KG/M2 | WEIGHT: 211 LBS | HEART RATE: 76 BPM

## 2021-03-31 DIAGNOSIS — E11.65 TYPE 2 DIABETES MELLITUS WITH HYPERGLYCEMIA, WITH LONG-TERM CURRENT USE OF INSULIN (HCC): ICD-10-CM

## 2021-03-31 DIAGNOSIS — F31.63 BIPOLAR 1 DISORDER, MIXED, SEVERE (HCC): Primary | ICD-10-CM

## 2021-03-31 DIAGNOSIS — Z79.4 TYPE 2 DIABETES MELLITUS WITH HYPERGLYCEMIA, WITH LONG-TERM CURRENT USE OF INSULIN (HCC): ICD-10-CM

## 2021-03-31 PROCEDURE — G8427 DOCREV CUR MEDS BY ELIG CLIN: HCPCS | Performed by: FAMILY MEDICINE

## 2021-03-31 PROCEDURE — 1036F TOBACCO NON-USER: CPT | Performed by: FAMILY MEDICINE

## 2021-03-31 PROCEDURE — G8484 FLU IMMUNIZE NO ADMIN: HCPCS | Performed by: FAMILY MEDICINE

## 2021-03-31 PROCEDURE — 1111F DSCHRG MED/CURRENT MED MERGE: CPT | Performed by: FAMILY MEDICINE

## 2021-03-31 PROCEDURE — 99214 OFFICE O/P EST MOD 30 MIN: CPT | Performed by: FAMILY MEDICINE

## 2021-03-31 PROCEDURE — G8417 CALC BMI ABV UP PARAM F/U: HCPCS | Performed by: FAMILY MEDICINE

## 2021-03-31 PROCEDURE — 3044F HG A1C LEVEL LT 7.0%: CPT | Performed by: FAMILY MEDICINE

## 2021-03-31 PROCEDURE — 2022F DILAT RTA XM EVC RTNOPTHY: CPT | Performed by: FAMILY MEDICINE

## 2021-03-31 PROCEDURE — 3017F COLORECTAL CA SCREEN DOC REV: CPT | Performed by: FAMILY MEDICINE

## 2021-03-31 RX ORDER — INSULIN GLARGINE 100 [IU]/ML
15 INJECTION, SOLUTION SUBCUTANEOUS NIGHTLY
Qty: 5 PEN | Refills: 5 | Status: SHIPPED
Start: 2021-03-31 | End: 2021-04-05

## 2021-03-31 ASSESSMENT — ENCOUNTER SYMPTOMS
NAUSEA: 0
SHORTNESS OF BREATH: 0
DIARRHEA: 1
VOMITING: 0
ABDOMINAL PAIN: 1

## 2021-03-31 NOTE — PROGRESS NOTES
Post-Discharge Transitional Care Management Services or Hospital Follow Up      Richelle Rainey   YOB: 1970    Date of Office Visit:  3/31/2021  Date of Hospital Admission: 3/29/21  Date of Hospital Discharge: 3/29/21  Readmission Risk Score(high >=14%. Medium >=10%):No data recorded    Care management risk score Rising risk (score 2-5) and Complex Care (Scores >=6): 9     Non face to face  following discharge, date last encounter closed (first attempt may have been earlier): *No documented post hospital discharge outreach found in the last 14 days     Call initiated 2 business days of discharge:      Patient Active Problem List   Diagnosis    Chronic hepatitis C without hepatic coma ([facility] [address]    Smoker    Bipolar affective disorder, currently depressed, moderate ([address]    Essential tremor    Obesity (BMI 30-39. 9)    Chronic periodontitis    Moderate dysplasia of cervix    Other and unspecified hyperlipidemia       Allergies   Allergen Reactions    Anticoagulant Sodium Citrate [Sodium Citrate Dihydrate]      The med they mix plasma with    Ciprofloxacin     Glipizide-Metformin Hcl     Penicillins Hives     Patient states she has an allergy to the antibiotic that is for \"anthrax\" poisoning, and she also has a severe allergy \"it will put me in the hospital - to \"anticoagulants\".     Vancomycin     Codeine Rash     itching       Medications listed as ordered at the time of discharge from hospital   [facility] Medication Instructions [facility]:    Printed on:04/03/21 0508   Medication Information                      Alcohol Swabs (ALCOHOL PREP) 70 % PADS  USE AS DIRECTED             B-D ULTRAFINE III SHORT PEN 31G X 8 MM MISC  use once daily             Blood Glucose Monitoring Suppl (FREESTYLE LITE) [place]  Check blood sugar qam             Blood Glucose Monitoring Suppl (ONE TOUCH ULTRA 2) w/Device KIT  Check blood sugar qam             blood glucose test strips (FREESTYLE LITE) difficulty  Expires 1/31/2024 1 each 0    lithium 300 MG capsule Take 300 mg by mouth daily          Medications patient taking as of now reconciled against medications ordered at time of hospital discharge: Yes    Chief Complaint   Patient presents with   [address] from [address]     wants back on insulin        Patient was recent discharged from [facility] for psychiatric admission. Had Bipolar exacerbation with hallucinations. Inpatient course: Discharge summary reviewed- see chart. Interval history/Current status: Patient states she started taking her glipizide-metformin after being released from [facility]. Patient states she is not tolerating the medication. Had not informed our staff that she was not taking it at her last visit here in the office. Patient went to ED 3/29 for hyperglycemia up to 300's, then took the glipizide-metformin and it dropped to the 70's instantly. In ED, blood sugar was down to 60. Patient plans to see an endocrinologist.  Recent lab results reviewed with patient, including BMP, CBC w/diff, and troponin which are remarkable for hypoglycemia. Review of Systems   Eyes: Negative for visual disturbance. Respiratory: Negative for shortness of breath. Cardiovascular: Negative for chest pain, palpitations and leg swelling. Gastrointestinal: Positive for abdominal pain and diarrhea. Negative for nausea and vomiting. Endocrine: Positive for polydipsia. Genitourinary: Negative for difficulty urinating, dysuria and frequency. Skin: Negative for rash. Psychiatric/Behavioral: Negative for dysphoric mood. Vitals:    03/31/21 1132   BP: 132/72   Pulse: 76   Resp: 20   SpO2: 98%   Weight: 211 lb (95.7 kg)   Height: 5' 9\" (1.753 m)     Body mass index is 31.16 kg/m².    Wt Readings from Last 3 Encounters:   03/31/21 211 lb (95.7 kg)   03/29/21 216 lb (98 kg)   03/15/21 216 lb (98 kg)     BP Readings from Last 3 Encounters:   03/31/21 132/72   03/29/21 137/83   03/15/21 132/84       Physical Exam  Vitals signs reviewed. Constitutional:       General: She is not in acute distress. Appearance: She is well-developed. Neck:      Musculoskeletal: Neck supple. Vascular: No carotid bruit. Cardiovascular:      Rate and Rhythm: Normal rate and regular rhythm. Heart sounds: Normal heart sounds. No murmur. No gallop. Pulmonary:      Effort: Pulmonary effort is normal.      Breath sounds: Normal breath sounds. No wheezing or rales. Abdominal:      General: Bowel sounds are normal. There is no distension. Palpations: Abdomen is soft. Tenderness: There is no abdominal tenderness. Musculoskeletal:      Right lower leg: No edema. Left lower leg: No edema. Skin:     General: Skin is warm and dry. Neurological:      Mental Status: She is alert and oriented to person, place, and time. Psychiatric:         Behavior: Behavior is agitated. Thought Content: Thought content is delusional.         Judgment: Judgment is impulsive. Assessment/Plan:  1. Bipolar 1 disorder, mixed, severe ([address]  - AL DISCHARGE MEDS RECONCILED W/ CURRENT OUTPATIENT MED LIST  - continue follow up with psychiatry    2. Type 2 diabetes mellitus with hyperglycemia, with long-term current use of insulin [facility])  - Zan Gosselin, MD, Endocrinology, [place]  - start insulin glargine [facility]) 100 UNIT/ML injection pen;  Inject 15 Units into the skin nightly  Dispense: 5 pen; Refill: 5        Medical Decision Making: high complexity

## 2021-03-31 NOTE — PATIENT INSTRUCTIONS
the plate format. Talk to your doctor, a dietitian, or a diabetes educator about your concerns. Carbohydrate counting  With carbohydrate counting, you plan meals based on the amount of carbohydrate in each food. Carbohydrate raises blood sugar higher and more quickly than any other nutrient. It is found in desserts, breads and cereals, and fruit. It's also found in starchy vegetables such as potatoes and corn, grains such as rice and pasta, and milk and yogurt. Spreading carbohydrate throughout the day helps keep your blood sugar levels within your target range. Your daily amount depends on several things, including your weight, how active you are, which diabetes medicines you take, and what your goals are for your blood sugar levels. A registered dietitian or diabetes educator can help you plan how much carbohydrate to include in each meal and snack. A guideline for your daily amount of carbohydrate is:  · 45 to 60 grams at each meal. That's about the same as 3 to 4 carbohydrate servings. · 15 to 20 grams at each snack. That's about the same as 1 carbohydrate serving. The Nutrition Facts label on packaged foods tells you how much carbohydrate is in a serving of the food. First, look at the serving size on the food label. Is that the amount you eat in a serving? All of the nutrition information on a food label is based on that serving size. So if you eat more or less than that, you'll need to adjust the other numbers. Total carbohydrate is the next thing you need to look for on the label. If you count carbohydrate servings, one serving of carbohydrate is 15 grams. For foods that don't come with labels, such as fresh fruits and vegetables, you'll need a guide that lists carbohydrate in these foods. Ask your doctor, dietitian, or diabetes educator about books or other nutrition guides you can use.   If you take insulin, you need to know how many grams of carbohydrate are in a meal. This lets you know how much rapid-acting insulin to take before you eat. If you use an insulin pump, you get a constant rate of insulin during the day. So the pump must be programmed at meals to give you extra insulin to cover the rise in blood sugar after meals. When you know how much carbohydrate you will eat, you can take the right amount of insulin. Or, if you always use the same amount of insulin, you need to make sure that you eat the same amount of carbohydrate at meals. If you need more help to understand carbohydrate counting and food labels, ask your doctor, dietitian, or diabetes educator. How can you plan healthy meals? Here are some tips to get started:  · Plan your meals a week at a time. Don't forget to include snacks too. · Use cookbooks or online recipes to plan several main meals. Plan some quick meals for busy nights. You also can double some recipes that freeze well. Then you can save half for other busy nights when you don't have time to cook. · Make sure you have the ingredients you need for your recipes. If you're running low on basic items, put these items on your shopping list too. · List foods that you use to make breakfasts, lunches, and snacks. List plenty of fruits and vegetables. · Post this list on the refrigerator. Add to it as you think of more things you need. · Take the list to the store to do your weekly shopping. Follow-up care is a key part of your treatment and safety. Be sure to make and go to all appointments, and call your doctor if you are having problems. It's also a good idea to know your test results and keep a list of the medicines you take. Where can you learn more? Go to [email or web address]://[email or web address].[email or web address] - Touch[employer]ize. org and sign in to your [employer] account. Enter F414 in the [facility] box to learn more about \"Learning About Meal Planning for Diabetes. \"     If you do not have an account, please click on the \"Sign Up Now\" link.   Current as of: August 31, 2020               Content Version: 12.8  © 2411-1764 [employer]wise, Inc[employer]. Care instructions adapted under license by [facility] ([facility]). If you have questions about a medical condition or this instruction, always ask your healthcare professional. [address] any warranty or liability for your use of this information.

## 2021-04-02 ENCOUNTER — TELEPHONE (OUTPATIENT)
Dept: FAMILY MEDICINE CLINIC | Age: 51
End: 2021-04-02

## 2021-04-02 DIAGNOSIS — E11.65 TYPE 2 DIABETES MELLITUS WITH HYPERGLYCEMIA, WITH LONG-TERM CURRENT USE OF INSULIN (HCC): ICD-10-CM

## 2021-04-02 DIAGNOSIS — Z79.4 TYPE 2 DIABETES MELLITUS WITH HYPERGLYCEMIA, WITH LONG-TERM CURRENT USE OF INSULIN (HCC): ICD-10-CM

## 2021-04-05 RX ORDER — INSULIN GLARGINE 100 [IU]/ML
15 INJECTION, SOLUTION SUBCUTANEOUS NIGHTLY
Qty: 5 PEN | Refills: 5 | Status: SHIPPED
Start: 2021-04-05 | End: 2021-04-28 | Stop reason: SDUPTHER

## 2021-04-06 ENCOUNTER — TELEPHONE (OUTPATIENT)
Dept: FAMILY MEDICINE CLINIC | Age: 51
End: 2021-04-06

## 2021-04-06 NOTE — TELEPHONE ENCOUNTER
Patient called she got her insulin, lantus pens she is to use 15 units at night,  Pt asking how long should she wait to see a difference in her BS?     Last seen 3/31/2021  Next appt 5/4/2021

## 2021-04-06 NOTE — TELEPHONE ENCOUNTER
Patient called stating she rec'd Lantus Solostar injection pens, but does not have any pen needles. I called pharmacy and gave verbal order to Babita Lora for pen needles, 1 box (#100), 3 refills. Patient informed. Patient asked for order for [employer] which she stated she's gotten before and her insurance has paid for.      Last seen 3/31/2021  Next appt 5/4/2021  [facility]

## 2021-04-08 ENCOUNTER — TELEPHONE (OUTPATIENT)
Dept: FAMILY MEDICINE CLINIC | Age: 51
End: 2021-04-08

## 2021-04-08 NOTE — TELEPHONE ENCOUNTER
Please explain to patient she should notice her blood sugars going down in the first few days. She can call with blood sugar readings in 2 weeks so I can determine if her insulin instructions need changed.

## 2021-04-08 NOTE — TELEPHONE ENCOUNTER
Pt called states she started lantus  3 days and her sugars are running higher than normal  I explained she has been off of meds for little give them few days and follow diet and if does not go down go to er or make appt with

## 2021-04-19 ENCOUNTER — TELEPHONE (OUTPATIENT)
Dept: FAMILY MEDICINE CLINIC | Age: 51
End: 2021-04-19

## 2021-04-19 NOTE — TELEPHONE ENCOUNTER
Pt called stating that  Her sugars are  High and she doesn't know what to do I called pt to make appt

## 2021-04-23 NOTE — TELEPHONE ENCOUNTER
Appt Information:  4/28/2021 @ 8:45am    Reason:  Pt called regarding her glucose levels still being sporadic and going up and down beginning of April. Pt stated she has an appt 5/4/21, but would like seen sooner. Advised I didn't have any sooner appts this week, but changed appt to 4/28/2021 @ 8:45am and stated I would send a PE to office incase there are any sooner openings prior to her NOV. Pt expressed understanding. Pt can be reached @ [phone].     PCP:  Oh Lisa MD    LOV:   3/31/2021    NOV:   4/28/2021    Thank You So Much!!!!

## 2021-04-28 ENCOUNTER — OFFICE VISIT (OUTPATIENT)
Dept: FAMILY MEDICINE CLINIC | Age: 51
End: 2021-04-28
Payer: COMMERCIAL

## 2021-04-28 VITALS
DIASTOLIC BLOOD PRESSURE: 74 MMHG | HEART RATE: 69 BPM | SYSTOLIC BLOOD PRESSURE: 126 MMHG | BODY MASS INDEX: 31.55 KG/M2 | OXYGEN SATURATION: 98 % | HEIGHT: 69 IN | WEIGHT: 213 LBS | RESPIRATION RATE: 20 BRPM

## 2021-04-28 DIAGNOSIS — E11.65 TYPE 2 DIABETES MELLITUS WITH HYPERGLYCEMIA, WITH LONG-TERM CURRENT USE OF INSULIN (HCC): Primary | ICD-10-CM

## 2021-04-28 DIAGNOSIS — E78.2 MIXED HYPERLIPIDEMIA: ICD-10-CM

## 2021-04-28 DIAGNOSIS — Z79.4 TYPE 2 DIABETES MELLITUS WITH HYPERGLYCEMIA, WITH LONG-TERM CURRENT USE OF INSULIN (HCC): Primary | ICD-10-CM

## 2021-04-28 PROCEDURE — G8427 DOCREV CUR MEDS BY ELIG CLIN: HCPCS | Performed by: FAMILY MEDICINE

## 2021-04-28 PROCEDURE — 2022F DILAT RTA XM EVC RTNOPTHY: CPT | Performed by: FAMILY MEDICINE

## 2021-04-28 PROCEDURE — G8417 CALC BMI ABV UP PARAM F/U: HCPCS | Performed by: FAMILY MEDICINE

## 2021-04-28 PROCEDURE — 1036F TOBACCO NON-USER: CPT | Performed by: FAMILY MEDICINE

## 2021-04-28 PROCEDURE — 3017F COLORECTAL CA SCREEN DOC REV: CPT | Performed by: FAMILY MEDICINE

## 2021-04-28 PROCEDURE — 99214 OFFICE O/P EST MOD 30 MIN: CPT | Performed by: FAMILY MEDICINE

## 2021-04-28 PROCEDURE — 3044F HG A1C LEVEL LT 7.0%: CPT | Performed by: FAMILY MEDICINE

## 2021-04-28 RX ORDER — INSULIN GLARGINE 100 [IU]/ML
25 INJECTION, SOLUTION SUBCUTANEOUS NIGHTLY
Qty: 5 PEN | Refills: 5 | Status: SHIPPED
Start: 2021-04-28 | End: 2021-05-12 | Stop reason: SDUPTHER

## 2021-04-28 RX ORDER — ATORVASTATIN CALCIUM 40 MG/1
40 TABLET, FILM COATED ORAL DAILY
Qty: 90 TABLET | Refills: 1 | Status: SHIPPED
Start: 2021-04-28 | End: 2022-06-07 | Stop reason: SINTOL

## 2021-04-28 ASSESSMENT — ENCOUNTER SYMPTOMS
NAUSEA: 0
DIARRHEA: 0
VOMITING: 0
SHORTNESS OF BREATH: 0

## 2021-04-28 NOTE — PATIENT INSTRUCTIONS
INCREASE LANTUS AS FOLLOWS:  If morning blood sugar is over 150 for 3 days in a row then increase by 3 units daily. Continue increasing by 3 units every 3 days if morning blood sugars remain over 150  Patient Education        Learning About Meal Planning for Diabetes  Why plan your meals? Meal planning can be a key part of managing diabetes. Planning meals and snacks with the right balance of carbohydrate, protein, and fat can help you keep your blood sugar at the target level you set with your doctor. You don't have to eat special foods. You can eat what your family eats, including sweets once in a while. But you do have to pay attention to how often you eat and how much you eat of certain foods. You may want to work with a dietitian or a certified diabetes educator. He or she can give you tips and meal ideas and can answer your questions about meal planning. This health professional can also help you reach a healthy weight if that is one of your goals. What plan is right for you? Your dietitian or diabetes educator may suggest that you start with the plate format or carbohydrate counting. The plate format  The plate format is a simple way to help you manage how you eat. You plan meals by learning how much space each food should take on a plate. Using the plate format helps you spread carbohydrate throughout the day. It can make it easier to keep your blood sugar level within your target range. It also helps you see if you're eating healthy portion sizes. To use the plate format, you put non-starchy vegetables on half your plate. Add meat or meat substitutes on one-quarter of the plate. Put a grain or starchy vegetable (such as brown rice or a potato) on the final quarter of the plate.  You can add a small piece of fruit and some low-fat or fat-free milk or yogurt, depending on your carbohydrate goal for each meal.  Here are some tips for using the plate format:  · Make sure that you are not using an oversized plate. A 9-inch plate is best. Many restaurants use larger plates. · Get used to using the plate format at home. Then you can use it when you eat out. · Write down your questions about using the plate format. Talk to your doctor, a dietitian, or a diabetes educator about your concerns. Carbohydrate counting  With carbohydrate counting, you plan meals based on the amount of carbohydrate in each food. Carbohydrate raises blood sugar higher and more quickly than any other nutrient. It is found in desserts, breads and cereals, and fruit. It's also found in starchy vegetables such as potatoes and corn, grains such as rice and pasta, and milk and yogurt. Spreading carbohydrate throughout the day helps keep your blood sugar levels within your target range. Your daily amount depends on several things, including your weight, how active you are, which diabetes medicines you take, and what your goals are for your blood sugar levels. A registered dietitian or diabetes educator can help you plan how much carbohydrate to include in each meal and snack. A guideline for your daily amount of carbohydrate is:  · 45 to 60 grams at each meal. That's about the same as 3 to 4 carbohydrate servings. · 15 to 20 grams at each snack. That's about the same as 1 carbohydrate serving. The Nutrition Facts label on packaged foods tells you how much carbohydrate is in a serving of the food. First, look at the serving size on the food label. Is that the amount you eat in a serving? All of the nutrition information on a food label is based on that serving size. So if you eat more or less than that, you'll need to adjust the other numbers. Total carbohydrate is the next thing you need to look for on the label. If you count carbohydrate servings, one serving of carbohydrate is 15 grams. For foods that don't come with labels, such as fresh fruits and vegetables, you'll need a guide that lists carbohydrate in these foods.  Ask your doctor, dietitian, or diabetes educator about books or other nutrition guides you can use. If you take insulin, you need to know how many grams of carbohydrate are in a meal. This lets you know how much rapid-acting insulin to take before you eat. If you use an insulin pump, you get a constant rate of insulin during the day. So the pump must be programmed at meals to give you extra insulin to cover the rise in blood sugar after meals. When you know how much carbohydrate you will eat, you can take the right amount of insulin. Or, if you always use the same amount of insulin, you need to make sure that you eat the same amount of carbohydrate at meals. If you need more help to understand carbohydrate counting and food labels, ask your doctor, dietitian, or diabetes educator. How can you plan healthy meals? Here are some tips to get started:  · Plan your meals a week at a time. Don't forget to include snacks too. · Use cookbooks or online recipes to plan several main meals. Plan some quick meals for busy nights. You also can double some recipes that freeze well. Then you can save half for other busy nights when you don't have time to cook. · Make sure you have the ingredients you need for your recipes. If you're running low on basic items, put these items on your shopping list too. · List foods that you use to make breakfasts, lunches, and snacks. List plenty of fruits and vegetables. · Post this list on the refrigerator. Add to it as you think of more things you need. · Take the list to the store to do your weekly shopping. Follow-up care is a key part of your treatment and safety. Be sure to make and go to all appointments, and call your doctor if you are having problems. It's also a good idea to know your test results and keep a list of the medicines you take. Where can you learn more? Go to [email or web address] org and sign in to your [employer] account.  Enter S504 in the [address] Information box to learn more about \"Learning About Meal Planning for Diabetes. \"     If you do not have an account, please click on the \"Sign Up Now\" link. Current as of: August 31, 2020               Content Version: 12.8  © 8204-4249 [employer]wise, Incorporated. Care instructions adapted under license by [facility] ([facility]). If you have questions about a medical condition or this instruction, always ask your healthcare professional. [address] any warranty or liability for your use of this information.

## 2021-04-28 NOTE — PROGRESS NOTES
OFFICE PROGRESS NOTE      SUBJECTIVE:        Patient ID:   Jimmy Ramirez is a 45 y.o. female whopresents for   Chief Complaint   Patient presents with    Diabetes     needs new rx for testing 2 x day for strips sugars running really high          HPI:   Patient is here to follow up on diabetes. Fasting blood sugars:190's Midday blood sugars: up to 269. Patient checks blood glucose 2-3 times per day. Patient is following diabetic diet. Patient is a nonsmoker. Last ophthalmology visit: 2020. Patient is taking a daily statin. Recent lab results reviewed including CMP, CBC with diff which are unremarkable. Patient states Crestor makes her feel cold. Wants to try something else for cholesterol. Prior to Admission medications    Medication Sig Start Date End Date Taking? Authorizing Provider   insulin glargine (LANTUS SOLOSTAR) 100 UNIT/ML injection pen Inject 25 Units into the skin nightly 4/28/21  Yes Clemencia Erwin MD   blood glucose test strips (ASCENSIA AUTODISC VI;ONE TOUCH ULTRA TEST VI) strip Check blood sugar TID 4/28/21  Yes Clemencia Erwin MD   atorvastatin (LIPITOR) 40 MG tablet Take 1 tablet by mouth daily 4/28/21  Yes Clemencia Erwin MD   Misc.  Devices MISC Sharps container 4/6/21  Yes Clemencia Erwin MD   Alcohol Swabs (ALCOHOL PREP) 70 % PADS USE AS DIRECTED 3/15/21  Yes Clemencia Erwin MD   FreeStyle Lancets MISC Check blood sugar qam 3/15/21  Yes Clemencia Erwin MD   Blood Glucose Monitoring Suppl (ONE TOUCH ULTRA 2) w/Device KIT Check blood sugar qam 3/15/21  Yes Clemencia Erwin MD   lithium (ESKALITH) 450 MG extended release tablet TAKE 1 & 1/2 TABLETS BY MOUTH AT BEDTIME 9/30/19  Yes Historical Provider, MD   ONE TOUCH LANCETS MISC Check blood sugar qam 10/21/19  Yes Clemencia Erwin MD   B-D ULTRAFINE III SHORT PEN 31G X 8 MM MISC use once daily 8/15/19  Yes Clemencia Erwin MD clonazePAM (KLONOPIN) 0.5 MG tablet Take 2 tablets by mouth nightly.  19  Yes Historical Provider, MD   Handicap Placard MISC by Does not apply route Unable to ambulate more than 20 feet without difficulty  Expires 2024  Yes Eduar Mott MD   lithium 300 MG capsule Take 300 mg by mouth daily   Yes Historical Provider, MD     Social History     Socioeconomic History    Marital status:      Spouse name: None    Number of children: None    Years of education: None    Highest education level: None   Occupational History    None   Social Needs    Financial resource strain: None    Food insecurity     Worry: None     Inability: None    Transportation needs     Medical: None     Non-medical: None   Tobacco Use    Smoking status: Former Smoker     Packs/day: 0.50     Years: 22.00     Pack years: 11.00     Types: Cigarettes     Quit date:      Years since quittin.3    Smokeless tobacco: Never Used   Substance and Sexual Activity    Alcohol use: No    Drug use: No    Sexual activity: Yes     Partners: Male   Lifestyle    Physical activity     Days per week: None     Minutes per session: None    Stress: None   Relationships    Social connections     Talks on phone: None     Gets together: None     Attends Latter-day service: None     Active member of club or organization: None     Attends meetings of clubs or organizations: None     Relationship status: None    Intimate partner violence     Fear of current or ex partner: None     Emotionally abused: None     Physically abused: None     Forced sexual activity: None   Other Topics Concern    None   Social History Narrative    None       I have reviewed Sanam's allergies, medications, problem list, medical, social and family history and have updated as needed in the electronic medical record    Current Outpatient Medications   Medication Sig Dispense Refill    insulin glargine (LANTUS SOLOSTAR) 100 UNIT/ML well-developed. Neck:      Musculoskeletal: Neck supple. Vascular: No carotid bruit. Cardiovascular:      Rate and Rhythm: Normal rate and regular rhythm. Heart sounds: Normal heart sounds. No murmur. No gallop. Pulmonary:      Effort: Pulmonary effort is normal.      Breath sounds: Normal breath sounds. No wheezing or rales. Abdominal:      General: Bowel sounds are normal. There is no distension. Palpations: Abdomen is soft. Tenderness: There is no abdominal tenderness. Musculoskeletal:      Right lower leg: No edema. Left lower leg: No edema. Skin:     General: Skin is warm and dry. Neurological:      Mental Status: She is alert and oriented to person, place, and time. Latrice Multani was seen today for diabetes. Diagnoses and all orders for this visit:    Type 2 diabetes mellitus with hyperglycemia, with long-term current use of insulin (HCC)  -     insulin glargine (LANTUS SOLOSTAR) 100 UNIT/ML injection pen; Inject 25 Units into the skin nightly  -     blood glucose test strips (ASCENSIA AUTODISC VI;ONE TOUCH ULTRA TEST VI) strip; Check blood sugar TID  -     Lipid Panel; Future    Mixed hyperlipidemia  -     atorvastatin (LIPITOR) 40 MG tablet; Take 1 tablet by mouth daily  -     Lipid Panel; Future        BMI was elevated today, and weight loss plan recommended is : conventional weight loss. Phone/MyChart follow up if tests abnormal.    Return in about 1 month (around 5/28/2021) for diabetes. I have reviewed my findings and recommendations with Jess Porter.     Carly Fallon M.D

## 2021-05-10 ENCOUNTER — TELEPHONE (OUTPATIENT)
Dept: FAMILY MEDICINE CLINIC | Age: 51
End: 2021-05-10

## 2021-05-10 NOTE — TELEPHONE ENCOUNTER
Pt called several times stating sugar is staying high she doesn't remember what to do , how much insulin to go up what should she do

## 2021-05-11 ENCOUNTER — TELEPHONE (OUTPATIENT)
Dept: FAMILY MEDICINE CLINIC | Age: 51
End: 2021-05-11

## 2021-05-11 DIAGNOSIS — E11.65 TYPE 2 DIABETES MELLITUS WITH HYPERGLYCEMIA, WITH LONG-TERM CURRENT USE OF INSULIN (HCC): ICD-10-CM

## 2021-05-11 DIAGNOSIS — Z79.4 TYPE 2 DIABETES MELLITUS WITH HYPERGLYCEMIA, WITH LONG-TERM CURRENT USE OF INSULIN (HCC): ICD-10-CM

## 2021-05-12 RX ORDER — INSULIN GLARGINE 100 [IU]/ML
40 INJECTION, SOLUTION SUBCUTANEOUS NIGHTLY
Qty: 10 PEN | Refills: 5 | Status: SHIPPED
Start: 2021-05-12 | End: 2022-06-07 | Stop reason: SDUPTHER

## 2021-06-02 ENCOUNTER — OFFICE VISIT (OUTPATIENT)
Dept: FAMILY MEDICINE CLINIC | Age: 51
End: 2021-06-02
Payer: COMMERCIAL

## 2021-06-02 VITALS
RESPIRATION RATE: 20 BRPM | OXYGEN SATURATION: 97 % | WEIGHT: 221 LBS | SYSTOLIC BLOOD PRESSURE: 134 MMHG | HEART RATE: 76 BPM | HEIGHT: 69 IN | BODY MASS INDEX: 32.73 KG/M2 | DIASTOLIC BLOOD PRESSURE: 82 MMHG

## 2021-06-02 DIAGNOSIS — E11.65 TYPE 2 DIABETES MELLITUS WITH HYPERGLYCEMIA, WITH LONG-TERM CURRENT USE OF INSULIN (HCC): Primary | ICD-10-CM

## 2021-06-02 DIAGNOSIS — F31.32 BIPOLAR AFFECTIVE DISORDER, CURRENTLY DEPRESSED, MODERATE (HCC): ICD-10-CM

## 2021-06-02 DIAGNOSIS — Z79.4 TYPE 2 DIABETES MELLITUS WITH HYPERGLYCEMIA, WITH LONG-TERM CURRENT USE OF INSULIN (HCC): Primary | ICD-10-CM

## 2021-06-02 LAB
CREATININE URINE POCT: NORMAL
HBA1C MFR BLD: 6.2 %
MICROALBUMIN/CREAT 24H UR: NORMAL MG/G{CREAT}
MICROALBUMIN/CREAT UR-RTO: NORMAL

## 2021-06-02 PROCEDURE — 1036F TOBACCO NON-USER: CPT | Performed by: FAMILY MEDICINE

## 2021-06-02 PROCEDURE — 3017F COLORECTAL CA SCREEN DOC REV: CPT | Performed by: FAMILY MEDICINE

## 2021-06-02 PROCEDURE — 3044F HG A1C LEVEL LT 7.0%: CPT | Performed by: FAMILY MEDICINE

## 2021-06-02 PROCEDURE — 99214 OFFICE O/P EST MOD 30 MIN: CPT | Performed by: FAMILY MEDICINE

## 2021-06-02 PROCEDURE — 83036 HEMOGLOBIN GLYCOSYLATED A1C: CPT | Performed by: FAMILY MEDICINE

## 2021-06-02 PROCEDURE — G8417 CALC BMI ABV UP PARAM F/U: HCPCS | Performed by: FAMILY MEDICINE

## 2021-06-02 PROCEDURE — G8427 DOCREV CUR MEDS BY ELIG CLIN: HCPCS | Performed by: FAMILY MEDICINE

## 2021-06-02 PROCEDURE — 82044 UR ALBUMIN SEMIQUANTITATIVE: CPT | Performed by: FAMILY MEDICINE

## 2021-06-02 PROCEDURE — 2022F DILAT RTA XM EVC RTNOPTHY: CPT | Performed by: FAMILY MEDICINE

## 2021-06-02 SDOH — ECONOMIC STABILITY: FOOD INSECURITY: WITHIN THE PAST 12 MONTHS, YOU WORRIED THAT YOUR FOOD WOULD RUN OUT BEFORE YOU GOT MONEY TO BUY MORE.: NEVER TRUE

## 2021-06-02 SDOH — ECONOMIC STABILITY: FOOD INSECURITY: WITHIN THE PAST 12 MONTHS, THE FOOD YOU BOUGHT JUST DIDN'T LAST AND YOU DIDN'T HAVE MONEY TO GET MORE.: NEVER TRUE

## 2021-06-02 ASSESSMENT — ENCOUNTER SYMPTOMS
DIARRHEA: 1
VOMITING: 0
NAUSEA: 0
CHEST TIGHTNESS: 1
SHORTNESS OF BREATH: 0

## 2021-06-02 ASSESSMENT — LIFESTYLE VARIABLES: HOW OFTEN DO YOU HAVE A DRINK CONTAINING ALCOHOL: NEVER

## 2021-06-02 NOTE — PROGRESS NOTES
Handicap Placard MISC by Does not apply route Unable to ambulate more than 20 feet without difficulty  Expires 2024  Yes Maia Strickland MD   lithium 300 MG capsule Take 300 mg by mouth daily   Yes Historical Provider, MD     Social History     Socioeconomic History    Marital status:      Spouse name: None    Number of children: None    Years of education: None    Highest education level: None   Occupational History    None   Tobacco Use    Smoking status: Former Smoker     Packs/day: 0.50     Years: 22.00     Pack years: 11.00     Types: Cigarettes     Quit date:      Years since quittin.4    Smokeless tobacco: Never Used   Substance and Sexual Activity    Alcohol use: No    Drug use: No    Sexual activity: Yes     Partners: Male   Other Topics Concern    None   Social History Narrative    None     Social Determinants of Health     Financial Resource Strain:     Difficulty of Paying Living Expenses:    Food Insecurity: No Food Insecurity    Worried About Running Out of Food in the Last Year: Never true    Lori of Food in the Last Year: Never true   Transportation Needs:     Lack of Transportation (Medical):  Lack of Transportation (Non-Medical):    Physical Activity:     Days of Exercise per Week:     Minutes of Exercise per Session:    Stress: No Stress Concern Present    Feeling of Stress :  Only a little   Social Connections:     Frequency of Communication with Friends and Family:     Frequency of Social Gatherings with Friends and Family:     Attends Scientology Services:     Active Member of Clubs or Organizations:     Attends Club or Organization Meetings:     Marital Status:    Intimate Partner Violence:     Fear of Current or Ex-Partner:     Emotionally Abused:     Physically Abused:     Sexually Abused:        I have reviewed Sanam's allergies, medications, problem list, medical, social and family history and have updated as needed in the electronic medical record    Current Outpatient Medications   Medication Sig Dispense Refill    FreeStyle Lancets MISC Check blood sugar tid-qid 200 each 12    insulin glargine (LANTUS SOLOSTAR) 100 UNIT/ML injection pen Inject 40 Units into the skin nightly 10 pen 5    blood glucose test strips (ASCENSIA AUTODISC VI;ONE TOUCH ULTRA TEST VI) strip Check blood sugar  each 12    atorvastatin (LIPITOR) 40 MG tablet Take 1 tablet by mouth daily 90 tablet 1    Misc. Devices MISC Sharps container 1 Device 0    Alcohol Swabs (ALCOHOL PREP) 70 % PADS USE AS DIRECTED 60 each 12    Blood Glucose Monitoring Suppl (ONE TOUCH ULTRA 2) w/Device KIT Check blood sugar qam 1 kit 0    lithium (ESKALITH) 450 MG extended release tablet TAKE 1 & 1/2 TABLETS BY MOUTH AT BEDTIME  1    ONE TOUCH LANCETS MISC Check blood sugar qam 50 each 12    B-D ULTRAFINE III SHORT PEN 31G X 8 MM MISC use once daily 100 each 2    clonazePAM (KLONOPIN) 0.5 MG tablet Take 2 tablets by mouth nightly. 1    Handicap Placard MISC by Does not apply route Unable to ambulate more than 20 feet without difficulty  Expires 1/31/2024 1 each 0    lithium 300 MG capsule Take 300 mg by mouth daily       No current facility-administered medications for this visit. Review Of Systems:    Review of Systems   Eyes: Negative for visual disturbance. Respiratory: Positive for chest tightness (left sided, comes and goes). Negative for shortness of breath. Cardiovascular: Negative for chest pain, palpitations and leg swelling. Gastrointestinal: Positive for diarrhea. Negative for nausea and vomiting. Genitourinary: Negative for difficulty urinating, dysuria and frequency. Skin: Negative for rash. Psychiatric/Behavioral: Positive for dysphoric mood.            OBJECTIVE:     VS:  Wt Readings from Last 3 Encounters:   06/02/21 221 lb (100.2 kg)   04/28/21 213 lb (96.6 kg)   03/31/21 211 lb (95.7 kg)     Vitals:    06/02/21 Reassurance given that patient's home readings may be falsely higher since A1C has not increased and is lower than previous readings  Bipolar affective disorder, currently depressed, moderate ([address]        -     Continue follow up with psychiatrist; emotionally labile currently      BMI was elevated today, and weight loss plan recommended is : conventional weight loss. Phone/MyChart follow up if tests abnormal.    Return in about 3 months (around 9/2/2021) for diabetes, 30 minute slot please. I have reviewed my findings and recommendations with Sanaz Dye.     Hardik Hogue MD, M.D

## 2021-06-03 RX ORDER — LANCETS 28 GAUGE
EACH MISCELLANEOUS
Qty: 200 EACH | Refills: 12 | Status: SHIPPED
Start: 2021-06-03 | End: 2022-10-04

## 2022-06-07 ENCOUNTER — OFFICE VISIT (OUTPATIENT)
Dept: FAMILY MEDICINE CLINIC | Age: 52
End: 2022-06-07
Payer: COMMERCIAL

## 2022-06-07 VITALS
DIASTOLIC BLOOD PRESSURE: 72 MMHG | RESPIRATION RATE: 20 BRPM | HEART RATE: 63 BPM | HEIGHT: 69 IN | WEIGHT: 205 LBS | BODY MASS INDEX: 30.36 KG/M2 | SYSTOLIC BLOOD PRESSURE: 124 MMHG | OXYGEN SATURATION: 99 %

## 2022-06-07 DIAGNOSIS — B18.2 CHRONIC HEPATITIS C WITHOUT HEPATIC COMA (HCC): ICD-10-CM

## 2022-06-07 DIAGNOSIS — Z79.4 TYPE 2 DIABETES MELLITUS WITHOUT COMPLICATION, WITH LONG-TERM CURRENT USE OF INSULIN (HCC): Primary | ICD-10-CM

## 2022-06-07 DIAGNOSIS — E11.9 TYPE 2 DIABETES MELLITUS WITHOUT COMPLICATION, WITH LONG-TERM CURRENT USE OF INSULIN (HCC): Primary | ICD-10-CM

## 2022-06-07 DIAGNOSIS — Z12.31 ENCOUNTER FOR SCREENING MAMMOGRAM FOR MALIGNANT NEOPLASM OF BREAST: ICD-10-CM

## 2022-06-07 DIAGNOSIS — F31.32 BIPOLAR AFFECTIVE DISORDER, CURRENTLY DEPRESSED, MODERATE (HCC): ICD-10-CM

## 2022-06-07 LAB
CREATININE URINE POCT: NORMAL
HBA1C MFR BLD: 5.8 %
MICROALBUMIN/CREAT 24H UR: NORMAL MG/G{CREAT}
MICROALBUMIN/CREAT UR-RTO: NORMAL

## 2022-06-07 PROCEDURE — G8427 DOCREV CUR MEDS BY ELIG CLIN: HCPCS | Performed by: FAMILY MEDICINE

## 2022-06-07 PROCEDURE — 2022F DILAT RTA XM EVC RTNOPTHY: CPT | Performed by: FAMILY MEDICINE

## 2022-06-07 PROCEDURE — G8417 CALC BMI ABV UP PARAM F/U: HCPCS | Performed by: FAMILY MEDICINE

## 2022-06-07 PROCEDURE — 1036F TOBACCO NON-USER: CPT | Performed by: FAMILY MEDICINE

## 2022-06-07 PROCEDURE — 83036 HEMOGLOBIN GLYCOSYLATED A1C: CPT | Performed by: FAMILY MEDICINE

## 2022-06-07 PROCEDURE — 99214 OFFICE O/P EST MOD 30 MIN: CPT | Performed by: FAMILY MEDICINE

## 2022-06-07 PROCEDURE — 82044 UR ALBUMIN SEMIQUANTITATIVE: CPT | Performed by: FAMILY MEDICINE

## 2022-06-07 PROCEDURE — 3044F HG A1C LEVEL LT 7.0%: CPT | Performed by: FAMILY MEDICINE

## 2022-06-07 PROCEDURE — 3017F COLORECTAL CA SCREEN DOC REV: CPT | Performed by: FAMILY MEDICINE

## 2022-06-07 RX ORDER — ASPIRIN 81 MG/1
81 TABLET, CHEWABLE ORAL DAILY
Qty: 30 TABLET | Refills: 3 | Status: SHIPPED | OUTPATIENT
Start: 2022-06-07

## 2022-06-07 RX ORDER — PEN NEEDLE, DIABETIC 31 GX5/16"
NEEDLE, DISPOSABLE MISCELLANEOUS
Qty: 100 EACH | Refills: 2 | Status: SHIPPED | OUTPATIENT
Start: 2022-06-07

## 2022-06-07 RX ORDER — UBIQUINOL 100 MG
CAPSULE ORAL
Qty: 60 EACH | Refills: 12 | Status: SHIPPED | OUTPATIENT
Start: 2022-06-07

## 2022-06-07 RX ORDER — INSULIN GLARGINE 100 [IU]/ML
42 INJECTION, SOLUTION SUBCUTANEOUS NIGHTLY
Qty: 10 PEN | Refills: 5 | Status: SHIPPED | OUTPATIENT
Start: 2022-06-07

## 2022-06-07 RX ORDER — LANCETS 28 GAUGE
EACH MISCELLANEOUS
Qty: 200 EACH | Refills: 12 | Status: CANCELLED | OUTPATIENT
Start: 2022-06-07

## 2022-06-07 RX ORDER — LANCETS 30 GAUGE
EACH MISCELLANEOUS
Qty: 150 EACH | Refills: 5 | Status: SHIPPED | OUTPATIENT
Start: 2022-06-07

## 2022-06-07 SDOH — ECONOMIC STABILITY: FOOD INSECURITY: WITHIN THE PAST 12 MONTHS, THE FOOD YOU BOUGHT JUST DIDN'T LAST AND YOU DIDN'T HAVE MONEY TO GET MORE.: NEVER TRUE

## 2022-06-07 SDOH — ECONOMIC STABILITY: FOOD INSECURITY: WITHIN THE PAST 12 MONTHS, YOU WORRIED THAT YOUR FOOD WOULD RUN OUT BEFORE YOU GOT MONEY TO BUY MORE.: NEVER TRUE

## 2022-06-07 ASSESSMENT — ENCOUNTER SYMPTOMS
NAUSEA: 0
DIARRHEA: 0
VOMITING: 0
SHORTNESS OF BREATH: 0

## 2022-06-07 ASSESSMENT — LIFESTYLE VARIABLES: HOW OFTEN DO YOU HAVE A DRINK CONTAINING ALCOHOL: NEVER

## 2022-06-07 NOTE — PROGRESS NOTES
OFFICE PROGRESS NOTE      SUBJECTIVE:        Patient ID:   Finn Zepeda is a 47 y.o. female who presents for   Chief Complaint   Patient presents with    Diabetes           HPI:   Patient is here to follow up on diabetes. Fasting blood sugars:120-130's Midday blood sugars: up to 175. Patient checks blood glucose 2-3 times per day. Patient is following diabetic diet. Patient is a nonsmoker. Last ophthalmology visit: yesterday--[facility]. Patient unable to tolerate daily statin. Patient has history of bipolar disorder. States she is no longer taking medication. Patient has history of hepatitis C. Has not had recent lab work. Prior to Admission medications    Medication Sig Start Date End Date Taking? Authorizing Provider   blood glucose test strips (ASCENSIA AUTODISC VI;ONE TOUCH ULTRA TEST VI) strip Check blood sugar TID 6/7/22  Yes Sanaz Tobar MD   insulin glargine (LANTUS SOLOSTAR) 100 UNIT/ML injection pen Inject 42 Units into the skin nightly 6/7/22  Yes Sanaz Tobar MD   Alcohol Swabs (ALCOHOL PREP) 70 % PADS USE AS DIRECTED 6/7/22  Yes Sanaz Tobar MD   Insulin Pen Needle (B-D ULTRAFINE III SHORT PEN) 31G X 8 MM MISC use once daily 6/7/22  Yes Sanaz Tobar MD   aspirin (ASPIRIN CHILDRENS) 81 MG chewable tablet Take 1 tablet by mouth daily 6/7/22  Yes Sanaz Tobar MD   Lancets MISC Check blood sugar tid 6/7/22  Yes Sanaz Tobar MD   FreeStyle Lancets MISC Check blood sugar tid-qid 6/3/21  Yes Sanaz Tobar MD   Misc.  Devices MISC Sharps container 4/6/21  Yes Sanaz Tobar MD   Blood Glucose Monitoring Suppl (ONE TOUCH ULTRA 2) w/Device KIT Check blood sugar qam 3/15/21  Yes Sanaz Tobar MD   lithium (ESKALITH) 450 MG extended release tablet TAKE 1 & 1/2 TABLETS BY MOUTH AT BEDTIME 9/30/19  Yes Historical Provider, MD   ONE TOUCH LANCETS MISC Check blood sugar qam 10/21/19  Yes Warner Capone MD   clonazePAM (KLONOPIN) 0.5 MG tablet Take 2 tablets by mouth nightly. 19  Yes Historical Provider, MD   Handicap Placard MISC by Does not apply route Unable to ambulate more than 20 feet without difficulty  Expires 2024  Yes Warner Capone MD   lithium 300 MG capsule Take 300 mg by mouth daily  Patient not taking: Reported on 2022    Historical Provider, MD     Social History     Socioeconomic History    Marital status:      Spouse name: None    Number of children: None    Years of education: None    Highest education level: None   Occupational History    None   Tobacco Use    Smoking status: Former Smoker     Packs/day: 0.50     Years: 22.00     Pack years: 11.00     Types: Cigarettes     Quit date:      Years since quittin.4    Smokeless tobacco: Never Used   Substance and Sexual Activity    Alcohol use: No    Drug use: No    Sexual activity: Yes     Partners: Male   Other Topics Concern    None   Social History Narrative    None     Social Determinants of Health     Financial Resource Strain:     Difficulty of Paying Living Expenses: Not on file   Food Insecurity: No Food Insecurity    Worried About Running Out of Food in the Last Year: Never true    Lori of Food in the Last Year: Never true   Transportation Needs:     Lack of Transportation (Medical): Not on file    Lack of Transportation (Non-Medical):  Not on file   Physical Activity:     Days of Exercise per Week: Not on file    Minutes of Exercise per Session: Not on file   Stress: No Stress Concern Present    Feeling of Stress : Not at all   Social Connections:     Frequency of Communication with Friends and Family: Not on file    Frequency of Social Gatherings with Friends and Family: Not on file    Attends Buddhist Services: Not on file    Active Member of Clubs or Organizations: Not on file    Attends Club or Organization Meetings: Not on file    Marital Status: Not on file   Intimate Partner Violence:     Fear of Current or Ex-Partner: Not on file    Emotionally Abused: Not on file    Physically Abused: Not on file    Sexually Abused: Not on file   Housing Stability:     Unable to Pay for Housing in the Last Year: Not on file    Number of [place] in the Last Year: Not on file    Unstable Housing in the Last Year: Not on file       I have reviewed Sanam's allergies, medications, problem list, medical, social and family history and have updated as needed in the electronic medical record    Current Outpatient Medications   Medication Sig Dispense Refill    blood glucose test strips (ASCENSIA AUTODISC VI;ONE TOUCH ULTRA TEST VI) strip Check blood sugar  each 12    insulin glargine (LANTUS SOLOSTAR) 100 UNIT/ML injection pen Inject 42 Units into the skin nightly 10 pen 5    Alcohol Swabs (ALCOHOL PREP) 70 % PADS USE AS DIRECTED 60 each 12    Insulin Pen Needle (B-D ULTRAFINE III SHORT PEN) 31G X 8 MM MISC use once daily 100 each 2    aspirin (ASPIRIN CHILDRENS) 81 MG chewable tablet Take 1 tablet by mouth daily 30 tablet 3    Lancets MISC Check blood sugar tid 150 each 5    FreeStyle Lancets MISC Check blood sugar tid-qid 200 each 12    Misc. Devices MISC Sharps container 1 Device 0    Blood Glucose Monitoring Suppl (ONE TOUCH ULTRA 2) w/Device KIT Check blood sugar qam 1 kit 0    lithium (ESKALITH) 450 MG extended release tablet TAKE 1 & 1/2 TABLETS BY MOUTH AT BEDTIME  1    ONE TOUCH LANCETS MISC Check blood sugar qam 50 each 12    clonazePAM (KLONOPIN) 0.5 MG tablet Take 2 tablets by mouth nightly. 1    Handicap Placard MISC by Does not apply route Unable to ambulate more than 20 feet without difficulty  Expires 1/31/2024 1 each 0    lithium 300 MG capsule Take 300 mg by mouth daily (Patient not taking: Reported on 6/7/2022)       No current facility-administered medications for this visit.          Review Of Systems:    Review of Systems   Eyes: Negative for visual disturbance. Respiratory: Negative for shortness of breath. Cardiovascular: Negative for chest pain, palpitations and leg swelling. Gastrointestinal: Negative for diarrhea, nausea and vomiting. Genitourinary: Negative for difficulty urinating, dysuria and frequency. Skin: Negative for rash. Psychiatric/Behavioral: Negative for dysphoric mood. OBJECTIVE:     VS:  Wt Readings from Last 3 Encounters:   06/07/22 205 lb (93 kg)   06/02/21 221 lb (100.2 kg)   04/28/21 213 lb (96.6 kg)     Vitals:    06/07/22 1524   BP: 124/72   Pulse: 63   Resp: 20   SpO2: 99%       Physical Exam  Vitals reviewed. Constitutional:       General: She is not in acute distress. Appearance: She is well-developed. Neck:      Vascular: No carotid bruit. Cardiovascular:      Rate and Rhythm: Normal rate and regular rhythm. Heart sounds: Normal heart sounds. No murmur heard. No gallop. Pulmonary:      Effort: Pulmonary effort is normal.      Breath sounds: Normal breath sounds. No wheezing or rales. Abdominal:      General: Bowel sounds are normal. There is no distension. Palpations: Abdomen is soft. Tenderness: There is no abdominal tenderness. Musculoskeletal:      Cervical back: Neck supple. Right lower leg: No edema. Left lower leg: No edema. Skin:     General: Skin is warm and dry. Neurological:      Mental Status: She is alert and oriented to person, place, and time. Results for orders placed or performed in visit on 06/07/22   POCT glycosylated hemoglobin (Hb A1C)   Result Value Ref Range    Hemoglobin A1C 5.8 %   POCT Microalbumin   Result Value Ref Range    Microalb, Ur 10 mg     Creatinine Ur POCT 200 mg     Microalbumin Creatinine Ratio <30 mg          Lear Tito was seen today for diabetes.     Diagnoses and all orders for this visit:    Type 2 diabetes mellitus without complication, with long-term current use of insulin (HCC)  -     POCT glycosylated hemoglobin (Hb A1C)  -     POCT Microalbumin  -     blood glucose test strips (ASCENSIA AUTODISC VI;ONE TOUCH ULTRA TEST VI) strip; Check blood sugar TID  -     insulin glargine (LANTUS SOLOSTAR) 100 UNIT/ML injection pen; Inject 42 Units into the skin nightly  -     Alcohol Swabs (ALCOHOL PREP) 70 % PADS; USE AS DIRECTED  -     Insulin Pen Needle (B-D ULTRAFINE III SHORT PEN) 31G X 8 MM MISC; use once daily  -     aspirin (ASPIRIN CHILDRENS) 81 MG chewable tablet; Take 1 tablet by mouth daily  -     Lancets MISC; Check blood sugar tid  -     CBC; Future  -     Comprehensive Metabolic Panel; Future  -     Lipid Panel; Future  -     TSH; Future    Bipolar affective disorder, currently depressed, moderate ([facility] [address]        -     Stable; will assess yearly    Chronic hepatitis C without hepatic coma (HCC)  -     Comprehensive Metabolic Panel; Future        -     Stable; will assess yearly    Encounter for screening mammogram for malignant neoplasm of breast        -     [facility]ICK Digital Screen Bilateral      Phone/MyChart follow up if tests abnormal.    Return in about 6 months (around 12/7/2022). I have reviewed my findings and recommendations with Dalia Hearn.     Rosalina Barrera MD, M.D

## 2022-06-21 DIAGNOSIS — B18.2 CHRONIC HEPATITIS C WITHOUT HEPATIC COMA (HCC): ICD-10-CM

## 2022-06-21 DIAGNOSIS — E11.9 TYPE 2 DIABETES MELLITUS WITHOUT COMPLICATION, WITH LONG-TERM CURRENT USE OF INSULIN (HCC): ICD-10-CM

## 2022-06-21 DIAGNOSIS — Z79.4 TYPE 2 DIABETES MELLITUS WITHOUT COMPLICATION, WITH LONG-TERM CURRENT USE OF INSULIN (HCC): ICD-10-CM

## 2022-06-21 LAB
ALBUMIN SERPL-MCNC: 4.3 G/DL (ref 3.5–5.2)
ALP BLD-CCNC: 91 U/L (ref 35–104)
ALT SERPL-CCNC: 12 U/L (ref 0–32)
ANION GAP SERPL CALCULATED.3IONS-SCNC: 17 MMOL/L (ref 7–16)
AST SERPL-CCNC: 18 U/L (ref 0–31)
BILIRUB SERPL-MCNC: 0.3 MG/DL (ref 0–1.2)
BUN BLDV-MCNC: 12 MG/DL (ref 6–20)
CALCIUM SERPL-MCNC: 9.3 MG/DL (ref 8.6–10.2)
CHLORIDE BLD-SCNC: 99 MMOL/L (ref 98–107)
CHOLESTEROL, TOTAL: 314 MG/DL (ref 0–199)
CO2: 22 MMOL/L (ref 22–29)
CREAT SERPL-MCNC: 1.1 MG/DL (ref 0.5–1)
GFR AFRICAN AMERICAN: >60
GFR NON-AFRICAN AMERICAN: 52 ML/MIN/1.73
GLUCOSE BLD-MCNC: 116 MG/DL (ref 74–99)
HCT VFR BLD CALC: 48.6 % (ref 34–48)
HDLC SERPL-MCNC: 48 MG/DL
HEMOGLOBIN: 15.1 G/DL (ref 11.5–15.5)
LDL CHOLESTEROL CALCULATED: 249 MG/DL (ref 0–99)
MCH RBC QN AUTO: 29.7 PG (ref 26–35)
MCHC RBC AUTO-ENTMCNC: 31.1 % (ref 32–34.5)
MCV RBC AUTO: 95.7 FL (ref 80–99.9)
PDW BLD-RTO: 12.8 FL (ref 11.5–15)
PLATELET # BLD: 242 E9/L (ref 130–450)
PMV BLD AUTO: 10.4 FL (ref 7–12)
POTASSIUM SERPL-SCNC: 4.2 MMOL/L (ref 3.5–5)
RBC # BLD: 5.08 E12/L (ref 3.5–5.5)
SODIUM BLD-SCNC: 138 MMOL/L (ref 132–146)
TOTAL PROTEIN: 7.4 G/DL (ref 6.4–8.3)
TRIGL SERPL-MCNC: 87 MG/DL (ref 0–149)
TSH SERPL DL<=0.05 MIU/L-ACNC: 0.82 UIU/ML (ref 0.27–4.2)
VLDLC SERPL CALC-MCNC: 17 MG/DL
WBC # BLD: 9.6 E9/L (ref 4.5–11.5)

## 2022-07-06 ENCOUNTER — TELEPHONE (OUTPATIENT)
Dept: FAMILY MEDICINE CLINIC | Age: 52
End: 2022-07-06

## 2022-07-07 NOTE — TELEPHONE ENCOUNTER
Please inform patient that her cholesterol was elevated. Since she cannot take statins, she will need to start following a low-cholesterol diet, eliminating fast food and fried foods. We will recheck her labs in 6 months.   The rest of her of her blood work was normal.

## 2022-09-11 ENCOUNTER — HOSPITAL ENCOUNTER (EMERGENCY)
Age: 52
Discharge: HOME | End: 2022-09-11
Payer: COMMERCIAL

## 2022-09-11 VITALS
OXYGEN SATURATION: 98 % | TEMPERATURE: 97.9 F | HEART RATE: 94 BPM | DIASTOLIC BLOOD PRESSURE: 69 MMHG | SYSTOLIC BLOOD PRESSURE: 114 MMHG | RESPIRATION RATE: 24 BRPM

## 2022-09-11 DIAGNOSIS — R21 RASH AND NONSPECIFIC SKIN ERUPTION: Primary | ICD-10-CM

## 2022-09-11 PROCEDURE — 6360000002 HC RX W HCPCS: Performed by: PHYSICIAN ASSISTANT

## 2022-09-11 PROCEDURE — 6370000000 HC RX 637 (ALT 250 FOR IP): Performed by: PHYSICIAN ASSISTANT

## 2022-09-11 PROCEDURE — 99283 EMERGENCY DEPT VISIT LOW MDM: CPT

## 2022-09-11 RX ORDER — HYDROXYZINE PAMOATE 25 MG/1
25 CAPSULE ORAL 3 TIMES DAILY PRN
Qty: 21 CAPSULE | Refills: 0 | Status: SHIPPED | OUTPATIENT
Start: 2022-09-11 | End: 2022-09-18

## 2022-09-11 RX ORDER — FAMOTIDINE 20 MG/1
20 TABLET, FILM COATED ORAL 2 TIMES DAILY
Qty: 14 TABLET | Refills: 0 | Status: SHIPPED | OUTPATIENT
Start: 2022-09-11 | End: 2022-09-18

## 2022-09-11 RX ORDER — DEXAMETHASONE SODIUM PHOSPHATE 10 MG/ML
10 INJECTION INTRAMUSCULAR; INTRAVENOUS ONCE
Status: COMPLETED | OUTPATIENT
Start: 2022-09-11 | End: 2022-09-11

## 2022-09-11 RX ORDER — HYDROXYZINE PAMOATE 25 MG/1
50 CAPSULE ORAL ONCE
Status: COMPLETED | OUTPATIENT
Start: 2022-09-11 | End: 2022-09-11

## 2022-09-11 RX ORDER — PREDNISONE 20 MG/1
TABLET ORAL
Qty: 18 TABLET | Refills: 0 | Status: SHIPPED | OUTPATIENT
Start: 2022-09-11 | End: 2022-09-21

## 2022-09-11 RX ORDER — FAMOTIDINE 20 MG/1
20 TABLET, FILM COATED ORAL ONCE
Status: COMPLETED | OUTPATIENT
Start: 2022-09-11 | End: 2022-09-11

## 2022-09-11 RX ADMIN — DEXAMETHASONE SODIUM PHOSPHATE 10 MG: 10 INJECTION INTRAMUSCULAR; INTRAVENOUS at 12:45

## 2022-09-11 RX ADMIN — HYDROXYZINE PAMOATE 50 MG: 25 CAPSULE ORAL at 12:45

## 2022-09-11 RX ADMIN — FAMOTIDINE 20 MG: 20 TABLET, FILM COATED ORAL at 12:45

## 2022-09-11 NOTE — Clinical Note
Asa Wang was seen and treated in our emergency department on 9/11/2022. She may return to work on 09/12/2022. If you have any questions or concerns, please don't hesitate to call.       Purcell Libman, PA-C
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
## 2022-10-03 ENCOUNTER — OFFICE VISIT (OUTPATIENT)
Dept: ORTHOPEDIC SURGERY | Age: 52
End: 2022-10-03
Payer: COMMERCIAL

## 2022-10-03 ENCOUNTER — TELEPHONE (OUTPATIENT)
Dept: FAMILY MEDICINE CLINIC | Age: 52
End: 2022-10-03

## 2022-10-03 VITALS — HEIGHT: 69 IN | BODY MASS INDEX: 30.36 KG/M2 | WEIGHT: 205 LBS | TEMPERATURE: 98 F

## 2022-10-03 DIAGNOSIS — M25.561 CHRONIC PAIN OF RIGHT KNEE: Primary | ICD-10-CM

## 2022-10-03 DIAGNOSIS — M17.11 PRIMARY OSTEOARTHRITIS OF RIGHT KNEE: ICD-10-CM

## 2022-10-03 DIAGNOSIS — G89.29 CHRONIC PAIN OF RIGHT KNEE: Primary | ICD-10-CM

## 2022-10-03 PROCEDURE — G8417 CALC BMI ABV UP PARAM F/U: HCPCS | Performed by: ORTHOPAEDIC SURGERY

## 2022-10-03 PROCEDURE — 1036F TOBACCO NON-USER: CPT | Performed by: ORTHOPAEDIC SURGERY

## 2022-10-03 PROCEDURE — 3017F COLORECTAL CA SCREEN DOC REV: CPT | Performed by: ORTHOPAEDIC SURGERY

## 2022-10-03 PROCEDURE — G8427 DOCREV CUR MEDS BY ELIG CLIN: HCPCS | Performed by: ORTHOPAEDIC SURGERY

## 2022-10-03 PROCEDURE — 99203 OFFICE O/P NEW LOW 30 MIN: CPT | Performed by: ORTHOPAEDIC SURGERY

## 2022-10-03 PROCEDURE — G8484 FLU IMMUNIZE NO ADMIN: HCPCS | Performed by: ORTHOPAEDIC SURGERY
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
## 2022-10-04 ENCOUNTER — EVALUATION (OUTPATIENT)
Dept: PHYSICAL THERAPY | Age: 52
End: 2022-10-04
Payer: COMMERCIAL

## 2022-10-04 DIAGNOSIS — G89.29 CHRONIC PAIN OF RIGHT KNEE: Primary | ICD-10-CM

## 2022-10-04 DIAGNOSIS — M25.561 CHRONIC PAIN OF RIGHT KNEE: Primary | ICD-10-CM

## 2022-10-04 PROCEDURE — 97163 PT EVAL HIGH COMPLEX 45 MIN: CPT | Performed by: PHYSICAL THERAPIST

## 2022-10-04 RX ORDER — BLOOD-GLUCOSE METER
EACH MISCELLANEOUS
Qty: 1 KIT | Refills: 0 | Status: SHIPPED | OUTPATIENT
Start: 2022-10-04
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
## 2022-10-04 NOTE — PROGRESS NOTES
Physical Therapy Daily Treatment Note    Date: 10/4/2022  Patient Name: Giuseppe Ingram  : 1970   MRN: [id number]  [facility]: 1 month ago   DOSx: -  Referring Provider: Alessandra Peace, [address] [address],  [facility]     Medical Diagnosis:   M25.561, G89.29 (ICD-10-CM) - Chronic pain of right knee    Outcome Measure:  LEFS  44% impairment      X = TO BE PERFORMED NEXT VISIT  > = PROGRESS TO THIS    Focus on HEP, moving to NM at end of 1 month    S: See eval  O: Discussed anatomy, physiology, body mechanics, principles of loading, and progressive loading/activity. Time 1056-5152     Visit 1 Repeat outcome measure at mid point and end. Pain Pain with activity 5/10     ROM      Modalities         MO   Manual            Stretch      Towel / strap DF, INV, EV   TE   bike   TA   Exercise      Ball / can rolling   TE   Toe curls      Heel slides   TE   QS   TE   SLR x  TE   SAQ   TE   [] TG  [] Leg Press 2-leg x  TE   [] TG  [] Leg Press 1-leg   TE   Hamstring Curl Machine   TE   Knee Extension Machine   TE   Step-ups - FWD x  TA   Step-ups - LAT x  TA   Step-ups - BWD   TA   Calf Raises x  TA      TA      TA               A:  Tolerated well.     P: Continue with rehab plan  Lani Phillips PT    Treatment Charges: Mins Units   Initial Evaluation 40 1   Re-Evaluation     Ther Exercise         TE     Manual Therapy     MT     Ther Activities        TA     Gait Training          GT     Neuro Re-education NR     Modalities     Non-Billable Service Time     Other     Total Time/Units 40 1

## 2022-10-06 ENCOUNTER — TREATMENT (OUTPATIENT)
Dept: PHYSICAL THERAPY | Age: 52
End: 2022-10-06
Payer: COMMERCIAL

## 2022-10-06 DIAGNOSIS — G89.29 CHRONIC PAIN OF RIGHT KNEE: Primary | ICD-10-CM

## 2022-10-06 DIAGNOSIS — M25.561 CHRONIC PAIN OF RIGHT KNEE: Primary | ICD-10-CM

## 2022-10-06 PROCEDURE — 97110 THERAPEUTIC EXERCISES: CPT

## 2022-10-06 NOTE — PROGRESS NOTES
Physical Therapy Daily Treatment Note    Date: 10/6/2022  Patient Name: Geovanna Sloan  : 1970   MRN: [id number]  [facility]: 1 month ago   DOSx: -  Referring Provider: aRmiro Tomas, [address] [place] [address],[address],  [facility]     Medical Diagnosis:   M25.561, G89.29 (ICD-10-CM) - Chronic pain of right knee    Outcome Measure:  LEFS  44% impairment    X = TO BE PERFORMED NEXT VISIT  > = PROGRESS TO THIS    Access Code: [email or web address]  URL: [email or web address]  Date: 10/06/2022  Prepared by: Vianey Masters    Exercises  Supine Straight Leg Raises - 3-4 x weekly - 3 sets - 10 reps  Seated Long Arc Quad - 3-4 x weekly - 3 sets - 10 reps - 5sec hold  Standing march with counter support option - 3-4 x weekly - 2 sets - 20 reps  Alternating Hip Abduction with counter support - 3-4 x weekly - 2 sets - 20 reps  Mini Squat with Counter Support - 3-4 x weekly - 3 sets - 10 reps  Standing Heel Raise - 3-4 x weekly - 3 sets - 10 reps      Focus on HEP, moving to NM at end of 1 month    S: Pt reports 5/10 right knee pain today. States R LE is weaker than left. O: Discussed anatomy, physiology, body mechanics, principles of loading, and progressive loading/activity. Time 9955-2868     Visit 2 Repeat outcome measure at mid point and end. Pain Pain with activity 5/10     ROM      Modalities         MO   Manual            Stretch      Towel / strap DF, INV, EV   TE   bike   TA   Exercise      NuStep L5 x 7 min     Heel slides   TE   QS   TE   SLR 2 x 20  TE   SAQ   TE   LAQ NEXT     [] TG  [x] Leg Press 2-leg L17 2 x 15  TE   [] TG  [] Leg Press 1-leg   TE   Hamstring Curl Machine   TE   Knee Extension Machine   TE   Step-ups - FWD 6\" x 20  TA   Step-ups - LAT 6\" x 20  TA   Step-ups - BWD   TA   Calf Raises X 20  TA   Mini squats   TA      TA               A:  Tolerated well.   Given HEP as above  P: Continue with rehab plan  Vianey Masters PTA    Treatment Charges: Mins Units   Initial Evaluation Re-Evaluation     Ther Exercise         TE 40 3   Manual Therapy     MT     Ther Activities        TA     Gait Training          GT     Neuro Re-education NR     Modalities     Non-Billable Service Time     Other     Total Time/Units 40 3

## 2022-10-11 ENCOUNTER — TREATMENT (OUTPATIENT)
Dept: PHYSICAL THERAPY | Age: 52
End: 2022-10-11
Payer: COMMERCIAL

## 2022-10-11 DIAGNOSIS — G89.29 CHRONIC PAIN OF RIGHT KNEE: Primary | ICD-10-CM

## 2022-10-11 DIAGNOSIS — M25.561 CHRONIC PAIN OF RIGHT KNEE: Primary | ICD-10-CM

## 2022-10-11 PROCEDURE — 97530 THERAPEUTIC ACTIVITIES: CPT

## 2022-10-11 PROCEDURE — 97110 THERAPEUTIC EXERCISES: CPT

## 2022-10-11 NOTE — PROGRESS NOTES
Physical Therapy Daily Treatment Note    Date: 10/11/2022  Patient Name: Lucy Vivar  : 1970   MRN: [id number]  [facility]: 1 month ago   DOSx: -  Referring Provider: Torsten Hernandez, [address],  [facility]     Medical Diagnosis:   M25.561, G89.29 (ICD-10-CM) - Chronic pain of right knee    Outcome Measure:  LEFS  44% impairment    X = TO BE PERFORMED NEXT VISIT  > = PROGRESS TO THIS    Access Code: [email or web address]  URL: [email or web address]  Date: 10/06/2022  Prepared by: Gissell Stack    Exercises  Supine Straight Leg Raises - 3-4 x weekly - 3 sets - 10 reps  Seated Long Arc Quad - 3-4 x weekly - 3 sets - 10 reps - 5sec hold  Standing march with counter support option - 3-4 x weekly - 2 sets - 20 reps  Alternating Hip Abduction with counter support - 3-4 x weekly - 2 sets - 20 reps  Mini Squat with Counter Support - 3-4 x weekly - 3 sets - 10 reps  Standing Heel Raise - 3-4 x weekly - 3 sets - 10 reps      Focus on HEP, moving to NM at end of 1 month    S: Pt reports soreness after last rx session . States R LE is weaker than left. O: Discussed anatomy, physiology, body mechanics, principles of loading, and progressive loading/activity. Time 3624-7222     Visit 3 Repeat outcome measure at mid point and end. Pain Pain with activity 5/10     ROM      Modalities         MO   Manual            Stretch      Towel / strap DF, INV, EV   TE   bike   TA   Exercise      NuStep     Recumbent bike  X 10 min seat no. 4 1/2            Heel slides   TE   QS   TE   SLR  TE   SAQ   TE   LAQ 2# 2 x 15 new  Next     [x] TG  [] Leg Press 2-leg L17 3 x 20  TE   TG calf raises  L 17 2 x 10  TE   Hamstring Curl Machine   TE   Knee Extension Machine   TE   Step-ups - FWD 6\" x 20  TA   Step-ups - LAT 6\" x 20  TA   Step-ups - BWD   TA         Calf Raises X 20  TA   Mini squats   TA      TA               A:  Tolerated well. Given HEP as above  P: Continue with rehab plan .  Pt will phone back after next visit for possible return visits not sure of times ?   Farhana Truong, PTA    Treatment Charges: Mins Units   Initial Evaluation     Re-Evaluation     Ther Exercise         TE 30 2   Manual Therapy     MT     Ther Activities        TA 10 1   Gait Training          GT     Neuro Re-education NR     Modalities     Non-Billable Service Time     Other     Total Time/Units 40 3

## 2023-02-23 ENCOUNTER — TELEPHONE (OUTPATIENT)
Dept: FAMILY MEDICINE CLINIC | Age: 53
End: 2023-02-23

## 2023-02-23 NOTE — TELEPHONE ENCOUNTER
I called patient and [facility] requesting patient call the [facility] back to schedule her Mammogram.

## 2023-06-28 ENCOUNTER — OFFICE VISIT (OUTPATIENT)
Dept: FAMILY MEDICINE CLINIC | Age: 53
End: 2023-06-28
Payer: COMMERCIAL

## 2023-06-28 VITALS
OXYGEN SATURATION: 99 % | HEART RATE: 92 BPM | DIASTOLIC BLOOD PRESSURE: 68 MMHG | BODY MASS INDEX: 25.18 KG/M2 | RESPIRATION RATE: 18 BRPM | HEIGHT: 69 IN | WEIGHT: 170 LBS | TEMPERATURE: 97.1 F | SYSTOLIC BLOOD PRESSURE: 124 MMHG

## 2023-06-28 DIAGNOSIS — Z79.4 TYPE 2 DIABETES MELLITUS WITHOUT COMPLICATION, WITH LONG-TERM CURRENT USE OF INSULIN (HCC): ICD-10-CM

## 2023-06-28 DIAGNOSIS — E11.9 TYPE 2 DIABETES MELLITUS WITHOUT COMPLICATION, WITH LONG-TERM CURRENT USE OF INSULIN (HCC): ICD-10-CM

## 2023-06-28 LAB — HBA1C MFR BLD: 5.2 %

## 2023-06-28 PROCEDURE — 1036F TOBACCO NON-USER: CPT | Performed by: FAMILY MEDICINE

## 2023-06-28 PROCEDURE — 3044F HG A1C LEVEL LT 7.0%: CPT | Performed by: FAMILY MEDICINE

## 2023-06-28 PROCEDURE — 99214 OFFICE O/P EST MOD 30 MIN: CPT | Performed by: FAMILY MEDICINE

## 2023-06-28 PROCEDURE — 2022F DILAT RTA XM EVC RTNOPTHY: CPT | Performed by: FAMILY MEDICINE

## 2023-06-28 PROCEDURE — G8427 DOCREV CUR MEDS BY ELIG CLIN: HCPCS | Performed by: FAMILY MEDICINE

## 2023-06-28 PROCEDURE — G8417 CALC BMI ABV UP PARAM F/U: HCPCS | Performed by: FAMILY MEDICINE

## 2023-06-28 PROCEDURE — 83037 HB GLYCOSYLATED A1C HOME DEV: CPT | Performed by: FAMILY MEDICINE

## 2023-06-28 PROCEDURE — 3017F COLORECTAL CA SCREEN DOC REV: CPT | Performed by: FAMILY MEDICINE

## 2023-06-28 RX ORDER — UBIQUINOL 100 MG
CAPSULE ORAL
Qty: 60 EACH | Refills: 12 | Status: SHIPPED | OUTPATIENT
Start: 2023-06-28

## 2023-06-28 RX ORDER — LANCETS 30 GAUGE
EACH MISCELLANEOUS
Qty: 150 EACH | Refills: 5 | Status: SHIPPED | OUTPATIENT
Start: 2023-06-28

## 2023-06-28 SDOH — ECONOMIC STABILITY: FOOD INSECURITY: WITHIN THE PAST 12 MONTHS, YOU WORRIED THAT YOUR FOOD WOULD RUN OUT BEFORE YOU GOT MONEY TO BUY MORE.: NEVER TRUE

## 2023-06-28 SDOH — ECONOMIC STABILITY: INCOME INSECURITY: HOW HARD IS IT FOR YOU TO PAY FOR THE VERY BASICS LIKE FOOD, HOUSING, MEDICAL CARE, AND HEATING?: NOT HARD AT ALL

## 2023-06-28 SDOH — ECONOMIC STABILITY: FOOD INSECURITY: WITHIN THE PAST 12 MONTHS, THE FOOD YOU BOUGHT JUST DIDN'T LAST AND YOU DIDN'T HAVE MONEY TO GET MORE.: NEVER TRUE

## 2023-06-28 SDOH — ECONOMIC STABILITY: HOUSING INSECURITY
IN THE LAST 12 MONTHS, WAS THERE A TIME WHEN YOU DID NOT HAVE A STEADY PLACE TO SLEEP OR SLEPT IN A SHELTER (INCLUDING NOW)?: NO

## 2023-06-28 ASSESSMENT — ENCOUNTER SYMPTOMS
DIARRHEA: 0
SHORTNESS OF BREATH: 0
VOMITING: 0
NAUSEA: 0

## 2023-06-28 ASSESSMENT — PATIENT HEALTH QUESTIONNAIRE - PHQ9
3. TROUBLE FALLING OR STAYING ASLEEP: 0
4. FEELING TIRED OR HAVING LITTLE ENERGY: 0
2. FEELING DOWN, DEPRESSED OR HOPELESS: 0
7. TROUBLE CONCENTRATING ON THINGS, SUCH AS READING THE NEWSPAPER OR WATCHING TELEVISION: 0
SUM OF ALL RESPONSES TO PHQ QUESTIONS 1-9: 0
9. THOUGHTS THAT YOU WOULD BE BETTER OFF DEAD, OR OF HURTING YOURSELF: 0
SUM OF ALL RESPONSES TO PHQ QUESTIONS 1-9: 0
1. LITTLE INTEREST OR PLEASURE IN DOING THINGS: 0
5. POOR APPETITE OR OVEREATING: 0
SUM OF ALL RESPONSES TO PHQ QUESTIONS 1-9: 0
8. MOVING OR SPEAKING SO SLOWLY THAT OTHER PEOPLE COULD HAVE NOTICED. OR THE OPPOSITE, BEING SO FIGETY OR RESTLESS THAT YOU HAVE BEEN MOVING AROUND A LOT MORE THAN USUAL: 0
SUM OF ALL RESPONSES TO PHQ9 QUESTIONS 1 & 2: 0
10. IF YOU CHECKED OFF ANY PROBLEMS, HOW DIFFICULT HAVE THESE PROBLEMS MADE IT FOR YOU TO DO YOUR WORK, TAKE CARE OF THINGS AT HOME, OR GET ALONG WITH OTHER PEOPLE: 0
SUM OF ALL RESPONSES TO PHQ QUESTIONS 1-9: 0
6. FEELING BAD ABOUT YOURSELF - OR THAT YOU ARE A FAILURE OR HAVE LET YOURSELF OR YOUR FAMILY DOWN: 0

## 2023-06-30 DIAGNOSIS — Z79.4 TYPE 2 DIABETES MELLITUS WITHOUT COMPLICATION, WITH LONG-TERM CURRENT USE OF INSULIN (HCC): ICD-10-CM

## 2023-06-30 DIAGNOSIS — E11.9 TYPE 2 DIABETES MELLITUS WITHOUT COMPLICATION, WITH LONG-TERM CURRENT USE OF INSULIN (HCC): ICD-10-CM

## 2023-06-30 LAB
ALBUMIN SERPL-MCNC: 4.1 G/DL (ref 3.5–5.2)
ALP SERPL-CCNC: 76 U/L (ref 35–104)
ALT SERPL-CCNC: 9 U/L (ref 0–32)
ANION GAP SERPL CALCULATED.3IONS-SCNC: 12 MMOL/L (ref 7–16)
AST SERPL-CCNC: 23 U/L (ref 0–31)
BILIRUB SERPL-MCNC: 0.3 MG/DL (ref 0–1.2)
BUN SERPL-MCNC: 19 MG/DL (ref 6–20)
CALCIUM SERPL-MCNC: 9.7 MG/DL (ref 8.6–10.2)
CHLORIDE SERPL-SCNC: 106 MMOL/L (ref 98–107)
CHOLESTEROL, TOTAL: 240 MG/DL (ref 0–199)
CO2 SERPL-SCNC: 24 MMOL/L (ref 22–29)
CREAT SERPL-MCNC: 1 MG/DL (ref 0.5–1)
CREAT UR-MCNC: 165 MG/DL (ref 29–226)
ERYTHROCYTE [DISTWIDTH] IN BLOOD BY AUTOMATED COUNT: 12.6 FL (ref 11.5–15)
GLUCOSE SERPL-MCNC: 96 MG/DL (ref 74–99)
HCT VFR BLD AUTO: 47.9 % (ref 34–48)
HDLC SERPL-MCNC: 48 MG/DL
HGB BLD-MCNC: 14.9 G/DL (ref 11.5–15.5)
LDLC SERPL CALC-MCNC: 179 MG/DL (ref 0–99)
MCH RBC QN AUTO: 30.7 PG (ref 26–35)
MCHC RBC AUTO-ENTMCNC: 31.1 % (ref 32–34.5)
MCV RBC AUTO: 98.6 FL (ref 80–99.9)
MICROALBUMIN UR-MCNC: <12 MG/L
MICROALBUMIN/CREAT UR-RTO: ABNORMAL (ref 0–30)
PLATELET # BLD AUTO: 185 E9/L (ref 130–450)
PMV BLD AUTO: 11.3 FL (ref 7–12)
POTASSIUM SERPL-SCNC: 4.5 MMOL/L (ref 3.5–5)
PROT SERPL-MCNC: 6.8 G/DL (ref 6.4–8.3)
RBC # BLD AUTO: 4.86 E12/L (ref 3.5–5.5)
SODIUM SERPL-SCNC: 142 MMOL/L (ref 132–146)
TRIGL SERPL-MCNC: 65 MG/DL (ref 0–149)
TSH SERPL-MCNC: 0.58 UIU/ML (ref 0.27–4.2)
VLDLC SERPL CALC-MCNC: 13 MG/DL
WBC # BLD: 5.1 E9/L (ref 4.5–11.5)

## 2023-07-14 ENCOUNTER — TELEPHONE (OUTPATIENT)
Dept: FAMILY MEDICINE CLINIC | Age: 53
End: 2023-07-14

## 2023-07-18 NOTE — TELEPHONE ENCOUNTER
I called patient and informed her, as noted by Dr. Kamilah Carey. Patient informed that she's been watching what she's eating, exercising and taking vitamins.

## 2023-07-18 NOTE — TELEPHONE ENCOUNTER
Please inform her that her cholesterol is still high but has improved from last year.   The rest of her labs are normal.

## 2023-07-26 ENCOUNTER — TELEPHONE (OUTPATIENT)
Dept: FAMILY MEDICINE CLINIC | Age: 53
End: 2023-07-26

## 2023-07-26 DIAGNOSIS — M25.561 RIGHT KNEE PAIN, UNSPECIFIED CHRONICITY: ICD-10-CM

## 2023-07-26 DIAGNOSIS — M54.50 LOW BACK PAIN WITHOUT SCIATICA, UNSPECIFIED BACK PAIN LATERALITY, UNSPECIFIED CHRONICITY: Primary | ICD-10-CM

## 2023-07-26 NOTE — TELEPHONE ENCOUNTER
[facility] location phone: [phone]   Referral does not need attn to anyone specific.

## 2023-07-26 NOTE — TELEPHONE ENCOUNTER
Patient called requesting referral for aqua-therapy for her back and right knee. Patient prefers [facility] in [facility]. Please fax referral to [phone]. Patient will call back with number for location.     Last seen 6/28/2023  Next appt Visit date not found

## 2023-08-07 ENCOUNTER — TELEPHONE (OUTPATIENT)
Dept: FAMILY MEDICINE CLINIC | Age: 53
End: 2023-08-07

## 2023-08-07 DIAGNOSIS — M54.50 LOW BACK PAIN, UNSPECIFIED BACK PAIN LATERALITY, UNSPECIFIED CHRONICITY, UNSPECIFIED WHETHER SCIATICA PRESENT: Primary | ICD-10-CM

## 2023-08-07 NOTE — TELEPHONE ENCOUNTER
Patient called for referral to a spine doctor. Pt stated she is looking for a female in the area. Her back is getting worse. Please advise.   Last seen 6/28/2023  Next appt Visit date not found

## 2023-08-10 NOTE — TELEPHONE ENCOUNTER
Please inform patient she would need to have back xrays before we can set up a referral. Order is in computer.

## 2024-03-06 ENCOUNTER — TELEPHONE (OUTPATIENT)
Dept: FAMILY MEDICINE CLINIC | Age: 54
End: 2024-03-06

## 2024-04-17 ENCOUNTER — APPOINTMENT (OUTPATIENT)
Dept: CT IMAGING | Age: 54
End: 2024-04-17
Payer: COMMERCIAL

## 2024-04-17 ENCOUNTER — HOSPITAL ENCOUNTER (EMERGENCY)
Age: 54
Discharge: HOME | End: 2024-04-17
Attending: EMERGENCY MEDICINE
Payer: COMMERCIAL

## 2024-04-17 VITALS
DIASTOLIC BLOOD PRESSURE: 94 MMHG | HEIGHT: 69 IN | HEART RATE: 102 BPM | RESPIRATION RATE: 18 BRPM | SYSTOLIC BLOOD PRESSURE: 133 MMHG | TEMPERATURE: 97.4 F | OXYGEN SATURATION: 99 % | BODY MASS INDEX: 32.58 KG/M2 | WEIGHT: 220 LBS

## 2024-04-17 DIAGNOSIS — T50.905A ADVERSE EFFECT OF DRUG, INITIAL ENCOUNTER: Primary | ICD-10-CM

## 2024-04-17 DIAGNOSIS — F41.1 ANXIETY STATE: ICD-10-CM

## 2024-04-17 LAB
ALBUMIN SERPL-MCNC: 4.1 G/DL (ref 3.5–5.2)
ALP SERPL-CCNC: 89 U/L (ref 35–104)
ALT SERPL-CCNC: 22 U/L (ref 0–32)
ANION GAP SERPL CALCULATED.3IONS-SCNC: 11 MMOL/L (ref 7–16)
AST SERPL-CCNC: 25 U/L (ref 0–31)
BASOPHILS # BLD: 0.08 K/UL (ref 0–0.2)
BASOPHILS NFR BLD: 1 % (ref 0–2)
BILIRUB SERPL-MCNC: 0.2 MG/DL (ref 0–1.2)
BUN SERPL-MCNC: 9 MG/DL (ref 6–20)
CALCIUM SERPL-MCNC: 9.8 MG/DL (ref 8.6–10.2)
CHLORIDE SERPL-SCNC: 104 MMOL/L (ref 98–107)
CO2 SERPL-SCNC: 24 MMOL/L (ref 22–29)
CREAT SERPL-MCNC: 0.9 MG/DL (ref 0.5–1)
EOSINOPHIL # BLD: 0.34 K/UL (ref 0.05–0.5)
EOSINOPHILS RELATIVE PERCENT: 4 % (ref 0–6)
ERYTHROCYTE [DISTWIDTH] IN BLOOD BY AUTOMATED COUNT: 12.3 % (ref 11.5–15)
GFR SERPL CREATININE-BSD FRML MDRD: 78 ML/MIN/1.73M2
GLUCOSE SERPL-MCNC: 117 MG/DL (ref 74–99)
HCT VFR BLD AUTO: 44.1 % (ref 34–48)
HGB BLD-MCNC: 14.8 G/DL (ref 11.5–15.5)
IMM GRANULOCYTES # BLD AUTO: <0.03 K/UL (ref 0–0.58)
IMM GRANULOCYTES NFR BLD: 0 % (ref 0–5)
LYMPHOCYTES NFR BLD: 2.89 K/UL (ref 1.5–4)
LYMPHOCYTES RELATIVE PERCENT: 37 % (ref 20–42)
MCH RBC QN AUTO: 30.5 PG (ref 26–35)
MCHC RBC AUTO-ENTMCNC: 33.6 G/DL (ref 32–34.5)
MCV RBC AUTO: 90.7 FL (ref 80–99.9)
MONOCYTES NFR BLD: 0.52 K/UL (ref 0.1–0.95)
MONOCYTES NFR BLD: 7 % (ref 2–12)
NEUTROPHILS NFR BLD: 50 % (ref 43–80)
NEUTS SEG NFR BLD: 3.88 K/UL (ref 1.8–7.3)
PLATELET # BLD AUTO: 245 K/UL (ref 130–450)
PMV BLD AUTO: 10.2 FL (ref 7–12)
POTASSIUM SERPL-SCNC: 4.7 MMOL/L (ref 3.5–5)
PROT SERPL-MCNC: 7.1 G/DL (ref 6.4–8.3)
RBC # BLD AUTO: 4.86 M/UL (ref 3.5–5.5)
SODIUM SERPL-SCNC: 139 MMOL/L (ref 132–146)
WBC OTHER # BLD: 7.7 K/UL (ref 4.5–11.5)

## 2024-04-17 PROCEDURE — 99284 EMERGENCY DEPT VISIT MOD MDM: CPT

## 2024-04-17 PROCEDURE — 6360000002 HC RX W HCPCS: Performed by: EMERGENCY MEDICINE

## 2024-04-17 PROCEDURE — 80053 COMPREHEN METABOLIC PANEL: CPT

## 2024-04-17 PROCEDURE — 96361 HYDRATE IV INFUSION ADD-ON: CPT

## 2024-04-17 PROCEDURE — 2580000003 HC RX 258: Performed by: EMERGENCY MEDICINE

## 2024-04-17 PROCEDURE — 6370000000 HC RX 637 (ALT 250 FOR IP): Performed by: EMERGENCY MEDICINE

## 2024-04-17 PROCEDURE — 85025 COMPLETE CBC W/AUTO DIFF WBC: CPT

## 2024-04-17 PROCEDURE — 96374 THER/PROPH/DIAG INJ IV PUSH: CPT

## 2024-04-17 PROCEDURE — 93005 ELECTROCARDIOGRAM TRACING: CPT | Performed by: EMERGENCY MEDICINE

## 2024-04-17 PROCEDURE — 70450 CT HEAD/BRAIN W/O DYE: CPT

## 2024-04-17 RX ORDER — DIAZEPAM 5 MG/1
5 TABLET ORAL ONCE
Status: COMPLETED | OUTPATIENT
Start: 2024-04-17 | End: 2024-04-17

## 2024-04-17 RX ORDER — 0.9 % SODIUM CHLORIDE 0.9 %
1000 INTRAVENOUS SOLUTION INTRAVENOUS ONCE
Status: COMPLETED | OUTPATIENT
Start: 2024-04-17 | End: 2024-04-17

## 2024-04-17 RX ORDER — ONDANSETRON 4 MG/1
4 TABLET, FILM COATED ORAL
Qty: 20 TABLET | Refills: 0 | Status: SHIPPED | OUTPATIENT
Start: 2024-04-17

## 2024-04-17 RX ORDER — ONDANSETRON 2 MG/ML
4 INJECTION INTRAMUSCULAR; INTRAVENOUS ONCE
Status: COMPLETED | OUTPATIENT
Start: 2024-04-17 | End: 2024-04-17

## 2024-04-17 RX ADMIN — ONDANSETRON 4 MG: 2 INJECTION INTRAMUSCULAR; INTRAVENOUS at 12:03

## 2024-04-17 RX ADMIN — SODIUM CHLORIDE 1000 ML: 9 INJECTION, SOLUTION INTRAVENOUS at 12:02

## 2024-04-17 RX ADMIN — DIAZEPAM 5 MG: 5 TABLET ORAL at 12:05

## 2024-04-17 ASSESSMENT — LIFESTYLE VARIABLES
HOW OFTEN DO YOU HAVE A DRINK CONTAINING ALCOHOL: NEVER
HOW MANY STANDARD DRINKS CONTAINING ALCOHOL DO YOU HAVE ON A TYPICAL DAY: PATIENT DOES NOT DRINK

## 2024-04-17 ASSESSMENT — PAIN - FUNCTIONAL ASSESSMENT: PAIN_FUNCTIONAL_ASSESSMENT: NONE - DENIES PAIN

## 2024-04-17 NOTE — ED PROVIDER NOTES
commands  Pearce Coma Scale Score: 15                CIWA Assessment  BP: (!) 133/94  Pulse: (!) 102           PHYSICAL EXAM  1 or more Elements     ED Triage Vitals   BP Temp Temp src Pulse Respirations SpO2 Height Weight - Scale   04/17/24 1018 04/17/24 1018 -- 04/17/24 1018 04/17/24 1040 04/17/24 1018 04/17/24 1040 04/17/24 1040   (!) 133/94 97.4 °F (36.3 °C)  (!) 102 20 99 % 1.753 m (5' 9\") 99.8 kg (220 lb)       Constitutional/General: Alert and oriented x3, appears anxious but in no distress  Head: Normocephalic   Eyes: PERRL, EOMI, conjunctiva normal, sclera non icteric  ENT:  Oropharynx clear, handling secretions,   Neck: Supple, full ROM, no stridor,   Respiratory: Lungs clear to auscultation bilaterally, no wheezes, rales, or rhonchi. Not in respiratory distress  Cardiovascular: Mildly tachycardic rate. Regular rhythm. No murmurs, no gallops, no rubs.   GI:  Abdomen Soft, Non tender,   Musculoskeletal: Moves all extremities x 4. Warm and well perfused, no cyanosis, no edema. Capillary refill <3 seconds  Integument: skin warm and dry. No rashes.  No diaphoresis or pallor  Neurologic: GCS 15, no focal deficits, symmetric strength 5/5 in the upper and lower extremities bilaterally  Psychiatric: Normal Affect, anxious appearing            DIAGNOSTIC RESULTS   LABS:    Labs Reviewed   COMPREHENSIVE METABOLIC PANEL - Abnormal; Notable for the following components:       Result Value    Glucose 117 (*)     All other components within normal limits   COVID-19, RAPID   INFLUENZA A + B, PCR   CBC WITH AUTO DIFFERENTIAL   LITHIUM LEVEL       As interpreted by me, the above displayed labs are abnormal. All other labs obtained during this visit were within normal range or not returned as of this dictation.      EKG Interpretation  Interpreted by emergency department physician, Maxx Harden DO      EKG Interpretation    Interpreted by emergency department physician    Rhythm: sinus bradycardia  Rate: 51  Axis:

## 2024-04-17 NOTE — ED NOTES
Monitored bed n/a at this time. Frequent obs. Will move patient when bed becomes available. Physician aware.

## 2024-04-18 LAB
EKG ATRIAL RATE: 52 BPM
EKG P AXIS: 58 DEGREES
EKG P-R INTERVAL: 128 MS
EKG Q-T INTERVAL: 396 MS
EKG QRS DURATION: 74 MS
EKG QTC CALCULATION (BAZETT): 364 MS
EKG R AXIS: 35 DEGREES
EKG T AXIS: 29 DEGREES
EKG VENTRICULAR RATE: 51 BPM

## 2024-04-18 PROCEDURE — 93010 ELECTROCARDIOGRAM REPORT: CPT | Performed by: INTERNAL MEDICINE

## 2025-04-14 ENCOUNTER — HOSPITAL ENCOUNTER (EMERGENCY)
Age: 55
Discharge: HOME | End: 2025-04-14
Payer: MEDICAID

## 2025-04-14 ENCOUNTER — APPOINTMENT (OUTPATIENT)
Dept: GENERAL RADIOLOGY | Age: 55
End: 2025-04-14
Payer: MEDICAID

## 2025-04-14 VITALS
TEMPERATURE: 98.1 F | HEART RATE: 72 BPM | OXYGEN SATURATION: 100 % | DIASTOLIC BLOOD PRESSURE: 67 MMHG | SYSTOLIC BLOOD PRESSURE: 119 MMHG | RESPIRATION RATE: 18 BRPM

## 2025-04-14 DIAGNOSIS — M25.561 CHRONIC PAIN OF RIGHT KNEE: Primary | ICD-10-CM

## 2025-04-14 DIAGNOSIS — G89.29 CHRONIC PAIN OF RIGHT KNEE: Primary | ICD-10-CM

## 2025-04-14 PROCEDURE — 73560 X-RAY EXAM OF KNEE 1 OR 2: CPT

## 2025-04-14 PROCEDURE — 99211 OFF/OP EST MAY X REQ PHY/QHP: CPT

## 2025-04-14 RX ORDER — MELOXICAM 7.5 MG/1
7.5 TABLET ORAL DAILY PRN
Qty: 14 TABLET | Refills: 0 | Status: SHIPPED | OUTPATIENT
Start: 2025-04-14 | End: 2025-04-28

## 2025-04-14 ASSESSMENT — PAIN - FUNCTIONAL ASSESSMENT: PAIN_FUNCTIONAL_ASSESSMENT: 0-10

## 2025-04-14 ASSESSMENT — PAIN SCALES - GENERAL: PAINLEVEL_OUTOF10: 8
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
